# Patient Record
Sex: MALE | Race: WHITE | NOT HISPANIC OR LATINO | URBAN - METROPOLITAN AREA
[De-identification: names, ages, dates, MRNs, and addresses within clinical notes are randomized per-mention and may not be internally consistent; named-entity substitution may affect disease eponyms.]

---

## 2024-03-31 ENCOUNTER — INPATIENT (INPATIENT)
Facility: HOSPITAL | Age: 49
LOS: 3 days | Discharge: HOME | End: 2024-04-04
Attending: INTERNAL MEDICINE | Admitting: INTERNAL MEDICINE
Payer: SELF-PAY

## 2024-03-31 VITALS
HEART RATE: 150 BPM | DIASTOLIC BLOOD PRESSURE: 93 MMHG | OXYGEN SATURATION: 99 % | SYSTOLIC BLOOD PRESSURE: 125 MMHG | RESPIRATION RATE: 18 BRPM | WEIGHT: 169.98 LBS | HEIGHT: 70 IN | TEMPERATURE: 102 F

## 2024-03-31 LAB
ALBUMIN SERPL ELPH-MCNC: 3.3 G/DL — SIGNIFICANT CHANGE UP (ref 3.3–5)
ALP SERPL-CCNC: 115 U/L — SIGNIFICANT CHANGE UP (ref 40–120)
ALT FLD-CCNC: 44 U/L — SIGNIFICANT CHANGE UP (ref 12–78)
ANION GAP SERPL CALC-SCNC: 8 MMOL/L — SIGNIFICANT CHANGE UP (ref 5–17)
APPEARANCE UR: CLEAR — SIGNIFICANT CHANGE UP
APTT BLD: 31.2 SEC — SIGNIFICANT CHANGE UP (ref 24.5–35.6)
AST SERPL-CCNC: 23 U/L — SIGNIFICANT CHANGE UP (ref 15–37)
BACTERIA # UR AUTO: ABNORMAL /HPF
BASOPHILS # BLD AUTO: 0.03 K/UL — SIGNIFICANT CHANGE UP (ref 0–0.2)
BASOPHILS NFR BLD AUTO: 0.2 % — SIGNIFICANT CHANGE UP (ref 0–2)
BILIRUB SERPL-MCNC: 0.6 MG/DL — SIGNIFICANT CHANGE UP (ref 0.2–1.2)
BILIRUB UR-MCNC: NEGATIVE — SIGNIFICANT CHANGE UP
BUN SERPL-MCNC: 14 MG/DL — SIGNIFICANT CHANGE UP (ref 7–23)
CALCIUM SERPL-MCNC: 9.2 MG/DL — SIGNIFICANT CHANGE UP (ref 8.5–10.1)
CHLORIDE SERPL-SCNC: 104 MMOL/L — SIGNIFICANT CHANGE UP (ref 96–108)
CO2 SERPL-SCNC: 23 MMOL/L — SIGNIFICANT CHANGE UP (ref 22–31)
COLOR SPEC: YELLOW — SIGNIFICANT CHANGE UP
CREAT SERPL-MCNC: 1.13 MG/DL — SIGNIFICANT CHANGE UP (ref 0.5–1.3)
DIFF PNL FLD: ABNORMAL
EGFR: 79 ML/MIN/1.73M2 — SIGNIFICANT CHANGE UP
EOSINOPHIL # BLD AUTO: 0.04 K/UL — SIGNIFICANT CHANGE UP (ref 0–0.5)
EOSINOPHIL NFR BLD AUTO: 0.2 % — SIGNIFICANT CHANGE UP (ref 0–6)
EPI CELLS # UR: PRESENT
GLUCOSE SERPL-MCNC: 128 MG/DL — HIGH (ref 70–99)
GLUCOSE UR QL: NEGATIVE MG/DL — SIGNIFICANT CHANGE UP
HCT VFR BLD CALC: 43.7 % — SIGNIFICANT CHANGE UP (ref 39–50)
HGB BLD-MCNC: 14.5 G/DL — SIGNIFICANT CHANGE UP (ref 13–17)
IMM GRANULOCYTES NFR BLD AUTO: 0.4 % — SIGNIFICANT CHANGE UP (ref 0–0.9)
INR BLD: 1.12 RATIO — SIGNIFICANT CHANGE UP (ref 0.85–1.18)
KETONES UR-MCNC: ABNORMAL MG/DL
LACTATE SERPL-SCNC: 1.5 MMOL/L — SIGNIFICANT CHANGE UP (ref 0.7–2)
LEUKOCYTE ESTERASE UR-ACNC: NEGATIVE — SIGNIFICANT CHANGE UP
LYMPHOCYTES # BLD AUTO: 11.3 % — LOW (ref 13–44)
LYMPHOCYTES # BLD AUTO: 2.11 K/UL — SIGNIFICANT CHANGE UP (ref 1–3.3)
MCHC RBC-ENTMCNC: 30.4 PG — SIGNIFICANT CHANGE UP (ref 27–34)
MCHC RBC-ENTMCNC: 33.2 G/DL — SIGNIFICANT CHANGE UP (ref 32–36)
MCV RBC AUTO: 91.6 FL — SIGNIFICANT CHANGE UP (ref 80–100)
MONOCYTES # BLD AUTO: 1.43 K/UL — HIGH (ref 0–0.9)
MONOCYTES NFR BLD AUTO: 7.7 % — SIGNIFICANT CHANGE UP (ref 2–14)
NEUTROPHILS # BLD AUTO: 14.91 K/UL — HIGH (ref 1.8–7.4)
NEUTROPHILS NFR BLD AUTO: 80.2 % — HIGH (ref 43–77)
NITRITE UR-MCNC: NEGATIVE — SIGNIFICANT CHANGE UP
NRBC # BLD: 0 /100 WBCS — SIGNIFICANT CHANGE UP (ref 0–0)
PH UR: 6.5 — SIGNIFICANT CHANGE UP (ref 5–8)
PLATELET # BLD AUTO: 380 K/UL — SIGNIFICANT CHANGE UP (ref 150–400)
POTASSIUM SERPL-MCNC: 3.4 MMOL/L — LOW (ref 3.5–5.3)
POTASSIUM SERPL-SCNC: 3.4 MMOL/L — LOW (ref 3.5–5.3)
PROT SERPL-MCNC: 8.4 GM/DL — HIGH (ref 6–8.3)
PROT UR-MCNC: NEGATIVE MG/DL — SIGNIFICANT CHANGE UP
PROTHROM AB SERPL-ACNC: 13.4 SEC — HIGH (ref 9.5–13)
RBC # BLD: 4.77 M/UL — SIGNIFICANT CHANGE UP (ref 4.2–5.8)
RBC # FLD: 13 % — SIGNIFICANT CHANGE UP (ref 10.3–14.5)
RBC CASTS # UR COMP ASSIST: 4 /HPF — SIGNIFICANT CHANGE UP (ref 0–4)
SODIUM SERPL-SCNC: 135 MMOL/L — SIGNIFICANT CHANGE UP (ref 135–145)
SP GR SPEC: 1.01 — SIGNIFICANT CHANGE UP (ref 1–1.03)
UROBILINOGEN FLD QL: 0.2 MG/DL — SIGNIFICANT CHANGE UP (ref 0.2–1)
WBC # BLD: 18.6 K/UL — HIGH (ref 3.8–10.5)
WBC # FLD AUTO: 18.6 K/UL — HIGH (ref 3.8–10.5)
WBC UR QL: 2 /HPF — SIGNIFICANT CHANGE UP (ref 0–5)

## 2024-03-31 PROCEDURE — 99285 EMERGENCY DEPT VISIT HI MDM: CPT

## 2024-03-31 PROCEDURE — 76870 US EXAM SCROTUM: CPT | Mod: 26

## 2024-03-31 PROCEDURE — 93010 ELECTROCARDIOGRAM REPORT: CPT

## 2024-03-31 RX ORDER — KETOROLAC TROMETHAMINE 30 MG/ML
15 SYRINGE (ML) INJECTION ONCE
Refills: 0 | Status: DISCONTINUED | OUTPATIENT
Start: 2024-03-31 | End: 2024-03-31

## 2024-03-31 RX ORDER — MIDAZOLAM HYDROCHLORIDE 1 MG/ML
1 INJECTION, SOLUTION INTRAMUSCULAR; INTRAVENOUS ONCE
Refills: 0 | Status: DISCONTINUED | OUTPATIENT
Start: 2024-03-31 | End: 2024-03-31

## 2024-03-31 RX ORDER — SODIUM CHLORIDE 9 MG/ML
2400 INJECTION INTRAMUSCULAR; INTRAVENOUS; SUBCUTANEOUS ONCE
Refills: 0 | Status: COMPLETED | OUTPATIENT
Start: 2024-03-31 | End: 2024-03-31

## 2024-03-31 RX ORDER — CEFTRIAXONE 500 MG/1
1000 INJECTION, POWDER, FOR SOLUTION INTRAMUSCULAR; INTRAVENOUS ONCE
Refills: 0 | Status: COMPLETED | OUTPATIENT
Start: 2024-03-31 | End: 2024-03-31

## 2024-03-31 RX ORDER — ACETAMINOPHEN 500 MG
975 TABLET ORAL ONCE
Refills: 0 | Status: COMPLETED | OUTPATIENT
Start: 2024-03-31 | End: 2024-03-31

## 2024-03-31 RX ADMIN — SODIUM CHLORIDE 2400 MILLILITER(S): 9 INJECTION INTRAMUSCULAR; INTRAVENOUS; SUBCUTANEOUS at 21:20

## 2024-03-31 RX ADMIN — Medication 1 MILLIGRAM(S): at 22:51

## 2024-03-31 RX ADMIN — MIDAZOLAM HYDROCHLORIDE 1 MILLIGRAM(S): 1 INJECTION, SOLUTION INTRAMUSCULAR; INTRAVENOUS at 21:17

## 2024-03-31 RX ADMIN — CEFTRIAXONE 1000 MILLIGRAM(S): 500 INJECTION, POWDER, FOR SOLUTION INTRAMUSCULAR; INTRAVENOUS at 21:59

## 2024-03-31 RX ADMIN — Medication 15 MILLIGRAM(S): at 21:22

## 2024-03-31 RX ADMIN — Medication 975 MILLIGRAM(S): at 21:16

## 2024-03-31 RX ADMIN — Medication 110 MILLIGRAM(S): at 21:55

## 2024-03-31 RX ADMIN — CEFTRIAXONE 100 MILLIGRAM(S): 500 INJECTION, POWDER, FOR SOLUTION INTRAMUSCULAR; INTRAVENOUS at 21:29

## 2024-03-31 NOTE — ED PROVIDER NOTE - NS ED MD TWO NIGHTS YN
Yes Full Thickness Lip Wedge Repair (Flap) Text: Given the location of the defect and the proximity to free margins a full thickness wedge repair was deemed most appropriate.  Using a sterile surgical marker, the appropriate repair was drawn incorporating the defect and placing the expected incisions perpendicular to the vermilion border.  The vermilion border was also meticulously outlined to ensure appropriate reapproximation during the repair.  The area thus outlined was incised through and through with a #15 scalpel blade.  The muscularis and dermis were reaproximated with deep sutures following hemostasis. Care was taken to realign the vermilion border before proceeding with the superficial closure.  Once the vermilion was realigned the superfical and mucosal closure was finished.

## 2024-03-31 NOTE — ED PROVIDER NOTE - OBJECTIVE STATEMENT
48-year-old male with history anxiety presenting to the ED with complaints of right-sided testicle pain for the past several days reports has been ongoing for the past 3 days, patient denies any associated tactile fever but was noted to have a fever 102.3 over a year and elevated heart rate, patient denies any penile discharge denies any dysuria urgency or frequency.  Reported right testicular swelling erythema and pain reports 8 out of 10 pain.  Patient denies any associated trauma.  No reported flank pain.  No hematuria.  Denies other complaints.
Statement Selected

## 2024-03-31 NOTE — ED ADULT NURSE NOTE - OBJECTIVE STATEMENT
----- Message from Corry Mercedes MD sent at 11/19/2023 10:31 AM CST -----  Please tell patient her DOMINIQUE virus antibody is negative.  Her CD4 +count and liver function tests are fine to continue Tysabri.  Okay to continue Tysabri.  Thanks, LR   Pt is AOx4 c/o testicular pain x 4 days. Pt began feeling pain to R teste on Wednesday a/w swelling. Teste is TTP, pt reports difficulty urinating. Currently c/o L lower back pain and RLQ pain. C/o nausea, chills and fever today. Pt is very anxious on exam. Denies pmhx Covering primary RN. Pt is AOx4 c/o testicular pain x 4 days. Pt began feeling pain to R teste on Wednesday a/w swelling. Teste is TTP, pt reports difficulty urinating. Currently c/o L lower back pain and RLQ pain, nausea, chills and fever today. Pt is very anxious on exam. Denies SI/HI. Denies pmhx

## 2024-03-31 NOTE — ED PROVIDER NOTE - CLINICAL SUMMARY MEDICAL DECISION MAKING FREE TEXT BOX
48-year-old male with history anxiety presenting to the ED with complaints of right-sided testicle pain for the past several days reports has been ongoing for the past 3 days, patient denies any associated tactile fever but was noted to have a fever 102.3 over a year and elevated heart rate, patient denies any penile discharge denies any dysuria urgency or frequency.  Reported right testicular swelling erythema and pain reports 8 out of 10 pain.  Patient denies any associated trauma.  No reported flank pain.  No hematuria.  Denies other complaints.    Diagnosis includes testicular torsion with necrosis, given 3 days of symptoms without evaluation, versus epididymitis versus urinary tract infection versus prostatitis, ultrasound of testicles, labs cultures, broad-spectrum antibiotics follow-up imaging labs and reassess.  Will sign out to overnight attending for further management and disposition

## 2024-03-31 NOTE — ED ADULT TRIAGE NOTE - WEIGHT IN LBS
169.9
no discharge, no irritation, no pain, no redness, and no visual changes.
xray with no bone involvement. bleeding has stopped with Surgicel and pressure dressing. Will dc with hand for follow up.

## 2024-03-31 NOTE — ED PROVIDER NOTE - PHYSICAL EXAMINATION
VITAL SIGNS: I have reviewed nursing notes and confirm.  CONSTITUTIONAL: well-appearing, non-toxic  SKIN: Warm dry, normal skin turgor  HEAD: NCAT  CARD: RRR, no murmurs, rubs or gallops  RESP: clear to ausculation b/l.  No rales, rhonchi, or wheezing.  ABD: soft, + BS, non-tender, non-distended, no rebound or guarding. No CVA tenderness  : RN [place] chaperone, R testicle significantly swollen, erythema noted, tenderness no penile discharge   EXT: Full ROM, no bony tenderness, no pedal edema, no calf tenderness  NEURO: normal motor. normal sensory.  PSYCH: Cooperative, anxious

## 2024-04-01 DIAGNOSIS — A41.9 SEPSIS, UNSPECIFIED ORGANISM: ICD-10-CM

## 2024-04-01 DIAGNOSIS — F41.9 ANXIETY DISORDER, UNSPECIFIED: ICD-10-CM

## 2024-04-01 DIAGNOSIS — F17.200 NICOTINE DEPENDENCE, UNSPECIFIED, UNCOMPLICATED: ICD-10-CM

## 2024-04-01 DIAGNOSIS — N45.1 EPIDIDYMITIS: ICD-10-CM

## 2024-04-01 DIAGNOSIS — E87.6 HYPOKALEMIA: ICD-10-CM

## 2024-04-01 LAB
HCT VFR BLD CALC: 42.2 % — SIGNIFICANT CHANGE UP (ref 39–50)
HGB BLD-MCNC: 13.6 G/DL — SIGNIFICANT CHANGE UP (ref 13–17)
MCHC RBC-ENTMCNC: 30.1 PG — SIGNIFICANT CHANGE UP (ref 27–34)
MCHC RBC-ENTMCNC: 32.2 G/DL — SIGNIFICANT CHANGE UP (ref 32–36)
MCV RBC AUTO: 93.4 FL — SIGNIFICANT CHANGE UP (ref 80–100)
NRBC # BLD: 0 /100 WBCS — SIGNIFICANT CHANGE UP (ref 0–0)
PLATELET # BLD AUTO: 365 K/UL — SIGNIFICANT CHANGE UP (ref 150–400)
POTASSIUM SERPL-MCNC: 4.4 MMOL/L — SIGNIFICANT CHANGE UP (ref 3.5–5.3)
POTASSIUM SERPL-SCNC: 4.4 MMOL/L — SIGNIFICANT CHANGE UP (ref 3.5–5.3)
RBC # BLD: 4.52 M/UL — SIGNIFICANT CHANGE UP (ref 4.2–5.8)
RBC # FLD: 13.1 % — SIGNIFICANT CHANGE UP (ref 10.3–14.5)
WBC # BLD: 34.24 K/UL — HIGH (ref 3.8–10.5)
WBC # FLD AUTO: 34.24 K/UL — HIGH (ref 3.8–10.5)

## 2024-04-01 PROCEDURE — 99222 1ST HOSP IP/OBS MODERATE 55: CPT

## 2024-04-01 PROCEDURE — 99254 IP/OBS CNSLTJ NEW/EST MOD 60: CPT

## 2024-04-01 RX ORDER — IBUPROFEN 200 MG
600 TABLET ORAL ONCE
Refills: 0 | Status: COMPLETED | OUTPATIENT
Start: 2024-04-01 | End: 2024-04-01

## 2024-04-01 RX ORDER — CEFTRIAXONE 500 MG/1
INJECTION, POWDER, FOR SOLUTION INTRAMUSCULAR; INTRAVENOUS
Refills: 0 | Status: DISCONTINUED | OUTPATIENT
Start: 2024-04-01 | End: 2024-04-02

## 2024-04-01 RX ORDER — CEFTRIAXONE 500 MG/1
1000 INJECTION, POWDER, FOR SOLUTION INTRAMUSCULAR; INTRAVENOUS ONCE
Refills: 0 | Status: COMPLETED | OUTPATIENT
Start: 2024-04-01 | End: 2024-04-01

## 2024-04-01 RX ORDER — CEFTRIAXONE 500 MG/1
1000 INJECTION, POWDER, FOR SOLUTION INTRAMUSCULAR; INTRAVENOUS EVERY 24 HOURS
Refills: 0 | Status: DISCONTINUED | OUTPATIENT
Start: 2024-04-02 | End: 2024-04-02

## 2024-04-01 RX ORDER — ONDANSETRON 8 MG/1
4 TABLET, FILM COATED ORAL EVERY 8 HOURS
Refills: 0 | Status: DISCONTINUED | OUTPATIENT
Start: 2024-04-01 | End: 2024-04-04

## 2024-04-01 RX ORDER — ACETAMINOPHEN 500 MG
650 TABLET ORAL EVERY 6 HOURS
Refills: 0 | Status: DISCONTINUED | OUTPATIENT
Start: 2024-04-01 | End: 2024-04-04

## 2024-04-01 RX ORDER — POTASSIUM CHLORIDE 20 MEQ
20 PACKET (EA) ORAL ONCE
Refills: 0 | Status: COMPLETED | OUTPATIENT
Start: 2024-04-01 | End: 2024-04-01

## 2024-04-01 RX ORDER — LANOLIN ALCOHOL/MO/W.PET/CERES
3 CREAM (GRAM) TOPICAL AT BEDTIME
Refills: 0 | Status: DISCONTINUED | OUTPATIENT
Start: 2024-04-01 | End: 2024-04-04

## 2024-04-01 RX ADMIN — Medication 650 MILLIGRAM(S): at 01:21

## 2024-04-01 RX ADMIN — Medication 650 MILLIGRAM(S): at 17:33

## 2024-04-01 RX ADMIN — ONDANSETRON 4 MILLIGRAM(S): 8 TABLET, FILM COATED ORAL at 18:34

## 2024-04-01 RX ADMIN — Medication 650 MILLIGRAM(S): at 15:46

## 2024-04-01 RX ADMIN — CEFTRIAXONE 100 MILLIGRAM(S): 500 INJECTION, POWDER, FOR SOLUTION INTRAMUSCULAR; INTRAVENOUS at 17:13

## 2024-04-01 RX ADMIN — Medication 20 MILLIEQUIVALENT(S): at 01:56

## 2024-04-01 RX ADMIN — Medication 1 MILLIGRAM(S): at 01:49

## 2024-04-01 RX ADMIN — Medication 1 TABLET(S): at 09:26

## 2024-04-01 RX ADMIN — Medication 600 MILLIGRAM(S): at 03:26

## 2024-04-01 RX ADMIN — ONDANSETRON 4 MILLIGRAM(S): 8 TABLET, FILM COATED ORAL at 10:54

## 2024-04-01 NOTE — ED ADULT NURSE REASSESSMENT NOTE - NS ED NURSE REASSESS COMMENT FT1
Pt febrile to 103, tachy to 140s at rest. Dr. Wells made aware, prn tylenol given per order. Pt placed on cooling blanket. Continuous SPO2 and cardiac monitoring in place, plan of care ongoing. Pt febrile to 103, tachy to 140s at rest. Dr. Wells made aware, prn tylenol given per order. Ice packs placed on pt while looking for cooling blanket.. Continuous SPO2 and cardiac monitoring in place, plan of care ongoing.

## 2024-04-01 NOTE — H&P ADULT - NSHPLABSRESULTS_GEN_ALL_CORE
14.5   18.60 )-----------( 380      ( 31 Mar 2024 21:18 )             43.7       135  |  104  |  14  ----------------------------<  128<H>  3.4<L>   |  23  |  1.13    Ca    9.2      31 Mar 2024 21:18    TPro  8.4<H>  /  Alb  3.3  /  TBili  0.6  /  DBili  x   /  AST  23  /  ALT  44  /  AlkPhos  115      Urinalysis Basic - ( 31 Mar 2024 23:10 )    Color: Yellow / Appearance: Clear / S.006 / pH: x  Gluc: x / Ketone: Trace mg/dL  / Bili: Negative / Urobili: 0.2 mg/dL   Blood: x / Protein: Negative mg/dL / Nitrite: Negative   Leuk Esterase: Negative / RBC: 4 /HPF / WBC 2 /HPF   Sq Epi: x / Non Sq Epi: x / Bacteria: Few /HPF    U/S testicles 3/31/24  FINDINGS:  RIGHT:  Right testis: 4.5 cm x 3.3 cm x 4.7 cm. Normal echogenicity and echotexture with no masses or areas of architectural distortion. Increased vascularity.  Right epididymis: Increased vascularity.  Right hydrocele: Moderate complex hydrocele  Right varicocele: None.    LEFT:  Left testis: 4.9 cm x 3.1 cm x 3.2 cm. Normal echogenicity and echotexture with no masses or areas of architectural distortion. Normal arterial and venous blood flow pattern.  Left epididymis: Within normal limits.  Left hydrocele: Yes, small  Left varicocele: None.    IMPRESSION:  Findings consistent with right epididymoorchitis.

## 2024-04-01 NOTE — H&P ADULT - NSHPPHYSICALEXAM_GEN_ALL_CORE
T(C): 37.8 (04-01-24 @ 00:27), Max: 39.1 (03-31-24 @ 20:49)  HR: 102 (04-01-24 @ 00:25) (102 - 150)  BP: 124/89 (04-01-24 @ 00:25) (124/89 - 125/93)  RR: 18 (04-01-24 @ 00:25) (18 - 18)  SpO2: 100% (04-01-24 @ 00:25) (99% - 100%)    CONSTITUTIONAL: Well groomed, anxious-appearing, shivering  EYES: PERRLA and symmetric, EOMI  ENMT: Oral mucosa with moist membranes  RESP: No respiratory distress, no use of accessory muscles; CTA b/l  CV: tachycardic  GI: Soft, NT, ND  : R. testicle swollen, erythematous, and tender to palpation

## 2024-04-01 NOTE — ED ADULT NURSE REASSESSMENT NOTE - NS ED NURSE REASSESS COMMENT FT1
Pt received from covering nurse. On cooling blanket and Tylenol given for a fever of 103.1. Plan of care on going. Pt in stretcher. MD Yo is aware. Safety maintained.

## 2024-04-01 NOTE — CHART NOTE - NSCHARTNOTEFT_GEN_A_CORE
Justin Torres is a 48 year old male with PMHx of anxiety and tobacco use who presented to the ED on 3/31/24 for complaints of right testicular pain and admitted for sepsis secondary to R. epididymoorchitis.    Sepsis secondary to R. epididymo-orchitis  Complaints of severe right testicular pain x 4 days, associated diaphoresis, chills, decreased appetite  Pain is worse with getting up and improved with laying on R. recumbent position  Temp 102.3, , WBC 18.60K on admission  U/A with trace ketones, trace blood, negative nitrites, negative leuks, WBC 2, few bacteria, squamous epithelial cells present  U/S testicles with R. epididymoorchitis  S/p NS 2400 cc bolus, ceftriaxone 1 g IV, doxycycline 100 mg IV, levofloxacin 500 mg IV, acetaminophen 975 mg PO, ketorolac 15 mg IV in the ED  Bactrim 1 DS tab BID x 10 days started for empiric coverage of gram-negative bacteria given low risk for STI  F/u blood cultures, urine culture, chlamydia/gonorrhea NAAT  Monitor fever curve and WBC trend  urology consulted    Hypokalemia  Potassium 3.4 on admission  KCl 20 mEq ordered  F/u repeat K in AM    Chronic medical conditions:  Anxiety: s/p lorazepam 1 mg IV and midazolam 1 mg IV in the ED, not on any PTA meds  Tobacco use: smokes 1 ppd x 30 years, not yet ready to quit, declined nicotine patch and nicorette gum

## 2024-04-01 NOTE — PATIENT PROFILE ADULT - FALL HARM RISK - UNIVERSAL INTERVENTIONS
Bed in lowest position, wheels locked, appropriate side rails in place/Call bell, personal items and telephone in reach/Instruct patient to call for assistance before getting out of bed or chair/Non-slip footwear when patient is out of bed/[place] to call system/Physically safe environment - no spills, clutter or unnecessary equipment/Purposeful Proactive Rounding/Room/bathroom lighting operational, light cord in reach

## 2024-04-01 NOTE — H&P ADULT - ASSESSMENT
Justin Torres is a 48 year old male with PMHx of anxiety and tobacco use who presented to the ED on 3/31/24 for complaints of right testicular pain and admitted for sepsis secondary to R. epididymoorchitis.    Sepsis secondary to R. epididymo-orchitis  Complaints of severe right testicular pain x 4 days, associated diaphoresis, chills, decreased appetite  Pain is worse with getting up and improved with laying on R. recumbent position  Temp 102.3, , WBC 18.60K on admission  U/A with trace ketones, trace blood, negative nitrites, negative leuks, WBC 2, few bacteria, squamous epithelial cells present  U/S testicles with R. epididymoorchitis  S/p NS 2400 cc bolus, ceftriaxone 1 g IV, doxycycline 100 mg IV, levofloxacin 500 mg IV, acetaminophen 975 mg PO, ketorolac 15 mg IV in the ED  Bactrim 1 DS tab BID x 10 days started for empiric coverage of gram-negative bacteria given low risk for STI  F/u blood cultures, urine culture, chlamydia/gonorrhea NAAT  Monitor fever curve and WBC trend  Consult urology given no urologist on-call tonight? - please call in AM    Hypokalemia  Potassium 3.4 on admission  KCl 20 mEq ordered  F/u repeat K in AM      Chronic medical conditions:  Anxiety: s/p lorazepam 1 mg IV and midazolam 1 mg IV in the ED, not on any PTA meds  Tobacco use: smokes 1 ppd x 30 years, not yet ready to quit, declined nicotine patch and nicorette gum

## 2024-04-01 NOTE — H&P ADULT - HISTORY OF PRESENT ILLNESS
Patient: Manjinder Conley   YOB: 1978   MR Number: [id number]     Today's Date: 11/29/2023   Date of Admission: 11/27/2023   Attending Physician: Fred Jones MD       To whom it may concern:    Please excuse Manjinder Conley from work. His reason for missing work is that he was a patient under the care of the medical team at [facility] from 11/27/2023 through 11/29/2023.    He is to return to work on 12/5/2023.            Thank you,      Fred Jones MD  11/29/2023           Justin Torres is a 48 year old male with PMHx of anxiety and tobacco use who presented to the ED on 3/31/24 for complaints of right testicular pain.    Patient reports he developed severe right testicular pain on Thursday and thought it was related to his anxiety. Since he already had a doctor's appointment for a physical with his union, he decided he would just ask their thoughts on it. He does not recall if they made any recommendations regarding his right testicular pain. Today, he became diaphoretic and with decreased appetite. Associated chills and "feels like he is freezing." States the pain was unbearable and severity was 7/10. Improved with laying in right lateral recumbent position and exacerbated with getting up. States that any movement that allows his scrotum to touch his leg worsens the pain. Did not take any analgesics at home. Has not been sexually active with wife in three years. Denies history of anal intercourse. No history of STDs. Denies dysuria, urinary frequency, or malodorous urine.     In the ED, VSS except Tmax 102.3 and HR as elevated as 150. WBC 18.60K, potassium 3.4. U/A with trace ketones, trace blood, negative nitrites, negative leuks, WBC 2, few bacteria, squamous epithelial cells present. U/S testicles with right epididymoorchitis. Received NS 2400 cc bolus, ceftriaxone 1 g IV, doxycycline 100 mg IV, levofloxacin 500 mg IV, acetaminophen 975 mg PO, ketorolac 15 mg IV, lorazepam 1 mg IV, and midazolam 1 mg IV. ER physician unable to contact urology because there is no urologist on call tonight.

## 2024-04-01 NOTE — CONSULT NOTE ADULT - ASSESSMENT
49 Y/O male with PMHx of anxiety and tobacco use who presented to the ED on 3/31/24 for complaints of right testicular pain. Urology consulted to evaluate epididymoorchitis of right testicle.   US of testicles reveals right epididymoorchitis.   Tmax of 103F overnight. Leukocytosis worsening.       PLAN:     - Recommend Rocephin for ABx   - Continue care per primary team   - Discussed with Dr. Artis  47 Y/O male with PMHx of anxiety and tobacco use who presented to the ED on 3/31/24 for complaints of right testicular pain. Urology consulted to evaluate epididymoorchitis of right testicle.   US of testicles reveals right epididymoorchitis.   Tmax of 103F overnight. Leukocytosis worsening.       PLAN:     - Recommend Rocephin for ABx   - F/U UCx   - Continue care per primary team   - Discussed with Dr. Artis

## 2024-04-02 LAB
ALBUMIN SERPL ELPH-MCNC: 2.5 G/DL — LOW (ref 3.3–5)
ALP SERPL-CCNC: 94 U/L — SIGNIFICANT CHANGE UP (ref 40–120)
ALT FLD-CCNC: 37 U/L — SIGNIFICANT CHANGE UP (ref 12–78)
ANION GAP SERPL CALC-SCNC: 5 MMOL/L — SIGNIFICANT CHANGE UP (ref 5–17)
AST SERPL-CCNC: 22 U/L — SIGNIFICANT CHANGE UP (ref 15–37)
BILIRUB SERPL-MCNC: 0.4 MG/DL — SIGNIFICANT CHANGE UP (ref 0.2–1.2)
BUN SERPL-MCNC: 18 MG/DL — SIGNIFICANT CHANGE UP (ref 7–23)
C TRACH RRNA SPEC QL NAA+PROBE: SIGNIFICANT CHANGE UP
CALCIUM SERPL-MCNC: 9.1 MG/DL — SIGNIFICANT CHANGE UP (ref 8.5–10.1)
CHLORIDE SERPL-SCNC: 108 MMOL/L — SIGNIFICANT CHANGE UP (ref 96–108)
CO2 SERPL-SCNC: 29 MMOL/L — SIGNIFICANT CHANGE UP (ref 22–31)
CREAT SERPL-MCNC: 0.9 MG/DL — SIGNIFICANT CHANGE UP (ref 0.5–1.3)
CULTURE RESULTS: NO GROWTH — SIGNIFICANT CHANGE UP
EGFR: 104 ML/MIN/1.73M2 — SIGNIFICANT CHANGE UP
GLUCOSE SERPL-MCNC: 87 MG/DL — SIGNIFICANT CHANGE UP (ref 70–99)
HCT VFR BLD CALC: 39.3 % — SIGNIFICANT CHANGE UP (ref 39–50)
HGB BLD-MCNC: 13.1 G/DL — SIGNIFICANT CHANGE UP (ref 13–17)
LACTATE SERPL-SCNC: 1.3 MMOL/L — SIGNIFICANT CHANGE UP (ref 0.7–2)
MAGNESIUM SERPL-MCNC: 2.3 MG/DL — SIGNIFICANT CHANGE UP (ref 1.6–2.6)
MCHC RBC-ENTMCNC: 30.3 PG — SIGNIFICANT CHANGE UP (ref 27–34)
MCHC RBC-ENTMCNC: 33.3 G/DL — SIGNIFICANT CHANGE UP (ref 32–36)
MCV RBC AUTO: 91 FL — SIGNIFICANT CHANGE UP (ref 80–100)
N GONORRHOEA RRNA SPEC QL NAA+PROBE: SIGNIFICANT CHANGE UP
NRBC # BLD: 0 /100 WBCS — SIGNIFICANT CHANGE UP (ref 0–0)
PHOSPHATE SERPL-MCNC: 2.4 MG/DL — LOW (ref 2.5–4.5)
PLATELET # BLD AUTO: 360 K/UL — SIGNIFICANT CHANGE UP (ref 150–400)
POTASSIUM SERPL-MCNC: 4 MMOL/L — SIGNIFICANT CHANGE UP (ref 3.5–5.3)
POTASSIUM SERPL-SCNC: 4 MMOL/L — SIGNIFICANT CHANGE UP (ref 3.5–5.3)
PROT SERPL-MCNC: 7 GM/DL — SIGNIFICANT CHANGE UP (ref 6–8.3)
RBC # BLD: 4.32 M/UL — SIGNIFICANT CHANGE UP (ref 4.2–5.8)
RBC # FLD: 13.1 % — SIGNIFICANT CHANGE UP (ref 10.3–14.5)
SODIUM SERPL-SCNC: 142 MMOL/L — SIGNIFICANT CHANGE UP (ref 135–145)
SPECIMEN SOURCE: SIGNIFICANT CHANGE UP
SPECIMEN SOURCE: SIGNIFICANT CHANGE UP
WBC # BLD: 19.37 K/UL — HIGH (ref 3.8–10.5)
WBC # FLD AUTO: 19.37 K/UL — HIGH (ref 3.8–10.5)

## 2024-04-02 PROCEDURE — 99222 1ST HOSP IP/OBS MODERATE 55: CPT

## 2024-04-02 PROCEDURE — 99232 SBSQ HOSP IP/OBS MODERATE 35: CPT

## 2024-04-02 RX ORDER — SODIUM,POTASSIUM PHOSPHATES 278-250MG
2 POWDER IN PACKET (EA) ORAL
Refills: 0 | Status: COMPLETED | OUTPATIENT
Start: 2024-04-02 | End: 2024-04-03

## 2024-04-02 RX ORDER — NICOTINE POLACRILEX 2 MG
1 GUM BUCCAL DAILY
Refills: 0 | Status: DISCONTINUED | OUTPATIENT
Start: 2024-04-02 | End: 2024-04-04

## 2024-04-02 RX ORDER — CEFTRIAXONE 500 MG/1
1000 INJECTION, POWDER, FOR SOLUTION INTRAMUSCULAR; INTRAVENOUS ONCE
Refills: 0 | Status: COMPLETED | OUTPATIENT
Start: 2024-04-02 | End: 2024-04-02

## 2024-04-02 RX ORDER — ENOXAPARIN SODIUM 100 MG/ML
40 INJECTION SUBCUTANEOUS EVERY 24 HOURS
Refills: 0 | Status: DISCONTINUED | OUTPATIENT
Start: 2024-04-02 | End: 2024-04-04

## 2024-04-02 RX ORDER — CEFTRIAXONE 500 MG/1
2000 INJECTION, POWDER, FOR SOLUTION INTRAMUSCULAR; INTRAVENOUS EVERY 24 HOURS
Refills: 0 | Status: DISCONTINUED | OUTPATIENT
Start: 2024-04-03 | End: 2024-04-04

## 2024-04-02 RX ORDER — TETANUS TOXOID, REDUCED DIPHTHERIA TOXOID AND ACELLULAR PERTUSSIS VACCINE, ADSORBED 5; 2.5; 8; 8; 2.5 [IU]/.5ML; [IU]/.5ML; UG/.5ML; UG/.5ML; UG/.5ML
0.5 SUSPENSION INTRAMUSCULAR ONCE
Refills: 0 | Status: COMPLETED | OUTPATIENT
Start: 2024-04-02 | End: 2024-04-04

## 2024-04-02 RX ADMIN — CEFTRIAXONE 100 MILLIGRAM(S): 500 INJECTION, POWDER, FOR SOLUTION INTRAMUSCULAR; INTRAVENOUS at 16:31

## 2024-04-02 RX ADMIN — ENOXAPARIN SODIUM 40 MILLIGRAM(S): 100 INJECTION SUBCUTANEOUS at 16:32

## 2024-04-02 RX ADMIN — Medication 2 PACKET(S): at 17:30

## 2024-04-02 RX ADMIN — Medication 2 PACKET(S): at 23:45

## 2024-04-02 NOTE — CONSULT NOTE ADULT - SUBJECTIVE AND OBJECTIVE BOX
[facility]  INFECTIOUS DISEASES   [address] [place] [address]  Tel: [phone]     Fax: [phone]  ==============================================================================  DO Sara Beck MD Alexandra Gutman, NP   ==============================================================================    VAZQUEZ MAXWELL  [id number]  Male  48y ([birth year])        Patient is a 48y old  Male who presents with a chief complaint of Sepsis secondary to R. epididymo-orchitis (02 Apr 2024 13:01)      HPI:  Vazquez Maxwell is a 48 year old male with PMHx of anxiety and tobacco use who presented to the ED on 3/31/24 for complaints of right testicular pain.    Patient reports he developed severe right testicular pain on Thursday and thought it was related to his anxiety. Since he already had a doctor's appointment for a physical with his union, he decided he would just ask their thoughts on it. He does not recall if they made any recommendations regarding his right testicular pain. Today, he became diaphoretic and with decreased appetite. Associated chills and "feels like he is freezing." States the pain was unbearable and severity was 7/10. Improved with laying in right lateral recumbent position and exacerbated with getting up. States that any movement that allows his scrotum to touch his leg worsens the pain. Did not take any analgesics at home. Has not been sexually active with wife in three years. Denies history of anal intercourse. No history of STDs. Denies dysuria, urinary frequency, or malodorous urine.     In the ED, VSS except Tmax 102.3 and HR as elevated as 150. WBC 18.60K, potassium 3.4. U/A with trace ketones, trace blood, negative nitrites, negative leuks, WBC 2, few bacteria, squamous epithelial cells present. U/S testicles with right epididymoorchitis. Received NS 2400 cc bolus, ceftriaxone 1 g IV, doxycycline 100 mg IV, levofloxacin 500 mg IV, acetaminophen 975 mg PO, ketorolac 15 mg IV, lorazepam 1 mg IV, and midazolam 1 mg IV. ER physician unable to contact urology because there is no urologist on call tonight. (01 Apr 2024 01:18)      ID consulted for workup and antibiotic management     PAST MEDICAL & SURGICAL HISTORY:  Anxiety      Tobacco dependence with current use          Allergies  No Known Allergies        ANTIMICROBIALS:      MEDICATIONS  (STANDING):    cefTRIAXone   IVPB   100 mL/Hr IV Intermittent (04-01-24 @ 17:13)    cefTRIAXone   IVPB   100 mL/Hr IV Intermittent (04-02-24 @ 16:31)    cefTRIAXone   IVPB   100 mL/Hr IV Intermittent (03-31-24 @ 21:29)    doxycycline IVPB   110 mL/Hr IV Intermittent (03-31-24 @ 21:55)    levoFLOXacin IVPB   100 mL/Hr IV Intermittent (04-01-24 @ 00:11)    trimethoprim  160 mG/sulfamethoxazole 800 mG   1 Tablet(s) Oral (04-01-24 @ 09:26)        OTHER MEDS: MEDICATIONS  (STANDING):  acetaminophen     Tablet .. 650 every 6 hours PRN  aluminum hydroxide/magnesium hydroxide/simethicone Suspension 30 every 4 hours PRN  enoxaparin Injectable 40 every 24 hours  melatonin 3 at bedtime PRN  ondansetron Injectable 4 every 8 hours PRN      SOCIAL HISTORY:     Smoking Cigarettes [ ]Active [ ] Former [ ]Denies   ETOH [ ]denies [ ]Former [ ]Current Use denies   Drug Use [ ]Never [ ] Former [ ] Active     FAMILY HISTORY:      REVIEW OF SYSTEMS  [  ] ROS unobtainable because:    [  ] All other systems negative except as noted below:	    Constitutional:  [ ] fever [ ] chills  [ ] weight loss  [ ] weakness  Skin:  [ ] rash [ ] phlebitis	  Eyes: [ ] icterus [ ] pain  [ ] discharge	  ENMT: [ ] sore throat  [ ] thrush [ ] ulcers [ ] exudates  Respiratory: [ ] dyspnea [ ] hemoptysis [ ] cough [ ] sputum	  Cardiovascular:  [ ] chest pain [ ] palpitations [ ] edema	  Gastrointestinal:  [ ] nausea [ ] vomiting [ ] diarrhea [ ] constipation [ ] pain	  Genitourinary:  [ ] dysuria [ ] frequency [ ] hematuria [ ] discharge [ ] flank pain  [ ] incontinence  Musculoskeletal:  [ ] myalgias [ ] arthralgias [ ] arthritis  [ ] back pain  Neurological:  [ ] headache [ ] seizures  [ ] confusion/altered mental status  Psychiatric:  [ ] anxiety [ ] depression	  Hematology/Lymphatics:  [ ] lymphadenopathy  Endocrine:  [ ] adrenal [ ] thyroid  Allergic/Immunologic:	 [ ] transplant [ ] seasonal    Vital Signs Last 24 Hrs  T(F): 98.2 (04-02-24 @ 10:42), Max: 103.1 (04-01-24 @ 01:15)    Vital Signs Last 24 Hrs  HR: 98 (04-02-24 @ 10:42) (91 - 105)  BP: 102/69 (04-02-24 @ 10:42) (99/62 - 107/72)  RR: 17 (04-02-24 @ 10:42)  SpO2: 94% (04-02-24 @ 10:42) (93% - 97%)  Wt(kg): --    PHYSICAL EXAM:  Constitutional: non-toxic, no distress  HEAD/EYES: anicteric, no conjunctival injection  ENT:  supple, no thrush  Cardiovascular:   normal S1, S2, no murmur, no edema  Respiratory:  clear BS bilaterally, no wheezes, no rales  GI:  soft, non-tender, normal bowel sounds  :  no andre, no CVA tenderness, erythematous swollen and painful right testes   Musculoskeletal:  no synovitis, normal ROM  Neurologic: awake and alert, normal strength, no focal findings  Skin:  no rash, no erythema, no phlebitis  Heme/Onc: no lymphadenopathy   Psychiatric:  awake, alert, appropriate mood          WBC Count: 19.37 K/uL (04-02 @ 10:05)  WBC Count: 34.24 K/uL (04-01 @ 09:58)  WBC Count: 18.60 K/uL (03-31 @ 21:18)      Auto Neutrophil %: 80.2 % *H* (03-31-24 @ 21:18)  Auto Neutrophil #: 14.91 K/uL *H* (03-31-24 @ 21:18)                            13.1   19.37 )-----------( 360      ( 02 Apr 2024 10:05 )             39.3       04-02    142  |  108  |  18  ----------------------------<  87  4.0   |  29  |  0.90    Ca    9.1      02 Apr 2024 10:05  Phos  2.4     04-02  Mg     2.3     04-02    TPro  7.0  /  Alb  2.5<L>  /  TBili  0.4  /  DBili  x   /  AST  22  /  ALT  37  /  AlkPhos  94  04-02      Creatinine Trend: 0.90<--, 1.13<--      Lactate, Blood: 1.3 mmol/L (04-02-24 @ 10:05)  Lactate, Blood: 1.5 mmol/L (03-31-24 @ 21:18)      MICROBIOLOGY:  Clean Catch Clean Catch (Midstream)  03-31-24   No growth  --  --      .Blood Blood-Peripheral  03-31-24   No growth at 24 hours  --  --      .Blood Blood-Peripheral  03-31-24   No growth at 24 hours  --  --      RADIOLOGY:  < from: US Testicles (03.31.24 @ 22:45) >  IMPRESSION:    Findings consistent with right epididymoorchitis.      I have personally reviewed the above imaging 
  Chief Complaint:  Patient is a 48y old  Male who presents with a chief complaint of Sepsis secondary to R. epididymo-orchitis (2024 01:18)      HPI:  Justin Torres is a 48 year old male with PMHx of anxiety and tobacco use who presented to the ED on 3/31/24 for complaints of right testicular pain.    Patient reports he developed severe right testicular pain on Thursday and thought it was related to his anxiety. Since he already had a doctor's appointment for a physical with his union, he decided he would just ask their thoughts on it. He does not recall if they made any recommendations regarding his right testicular pain. Today, he became diaphoretic and with decreased appetite. Associated chills and "feels like he is freezing." States the pain was unbearable and severity was 7/10. Improved with laying in right lateral recumbent position and exacerbated with getting up. States that any movement that allows his scrotum to touch his leg worsens the pain. Did not take any analgesics at home. Has not been sexually active with wife in three years. Denies history of anal intercourse. No history of STDs. Denies dysuria, urinary frequency, or malodorous urine.     In the ED, VSS except Tmax 102.3 and HR as elevated as 150. WBC 18.60K, potassium 3.4. U/A with trace ketones, trace blood, negative nitrites, negative leuks, WBC 2, few bacteria, squamous epithelial cells present. U/S testicles with right epididymoorchitis. Received NS 2400 cc bolus, ceftriaxone 1 g IV, doxycycline 100 mg IV, levofloxacin 500 mg IV, acetaminophen 975 mg PO, ketorolac 15 mg IV, lorazepam 1 mg IV, and midazolam 1 mg IV. ER physician unable to contact urology because there is no urologist on call tonight. (2024 01:18)      PMH/PSH:PAST MEDICAL & SURGICAL HISTORY:  Anxiety      Tobacco dependence with current use          Allergies:  No Known Allergies      Medications:  acetaminophen     Tablet .. 650 milliGRAM(s) Oral every 6 hours PRN  aluminum hydroxide/magnesium hydroxide/simethicone Suspension 30 milliLiter(s) Oral every 4 hours PRN  melatonin 3 milliGRAM(s) Oral at bedtime PRN  ondansetron Injectable 4 milliGRAM(s) IV Push every 8 hours PRN  trimethoprim  160 mG/sulfamethoxazole 800 mG 1 Tablet(s) Oral two times a day      Review of Systems:  Negative except for HPI    Relevant Family History:   FAMILY HISTORY:      Relevant Social History: Alcohol ( -) , Tobacco ( -) , Illicit drugs (- )     Physical Exam:    Vital Signs:  Vital Signs Last 24 Hrs  T(C): 37.1 (2024 11:04), Max: 39.5 (2024 01:15)  T(F): 98.8 (2024 11:04), Max: 103.1 (2024 01:15)  HR: 103 (2024 11:04) (91 - 150)  BP: 108/68 (2024 11:04) (100/68 - 147/89)  BP(mean): --  RR: 17 (2024 11:04) (17 - 25)  SpO2: 97% (2024 11:04) (97% - 100%)    Parameters below as of 2024 11:04  Patient On (Oxygen Delivery Method): room air      Daily Height in cm: 177.8 (2024 06:30)    Daily     General:  Appears stated age, no distress  HEENT:  NC/AT,  conjunctivae clear and pink  Chest:  Full & symmetric excursion  Cardiovascular:  Regular rhythm  Abdomen:  Soft, non tender, non distended  Extremities:  no cyanosis, clubbing or edema  : Circumcised phallus with right enlarged, erythematous, swollen testicle with TTP; left testicle nontender   Skin:  No rash/erythema/ecchymoses/petechiae/wounds/abscess/warm/dry  Neuro/Psych:  Alert, oriented    Laboratory:                          13.6   34.24 )-----------( 365      ( 2024 09:58 )             42.2     04-01    x   |  x   |  x   ----------------------------<  x   4.4   |  x   |  x     Ca    9.2      31 Mar 2024 21:18    TPro  8.4<H>  /  Alb  3.3  /  TBili  0.6  /  DBili  x   /  AST  23  /  ALT  44  /  AlkPhos  115  03-31    LIVER FUNCTIONS - ( 31 Mar 2024 21:18 )  Alb: 3.3 g/dL / Pro: 8.4 gm/dL / ALK PHOS: 115 U/L / ALT: 44 U/L / AST: 23 U/L / GGT: x           PT/INR - ( 31 Mar 2024 21:18 )   PT: 13.4 sec;   INR: 1.12 ratio         PTT - ( 31 Mar 2024 21:18 )  PTT:31.2 sec  Urinalysis Basic - ( 31 Mar 2024 23:10 )    Color: Yellow / Appearance: Clear / S.006 / pH: x  Gluc: x / Ketone: Trace mg/dL  / Bili: Negative / Urobili: 0.2 mg/dL   Blood: x / Protein: Negative mg/dL / Nitrite: Negative   Leuk Esterase: Negative / RBC: 4 /HPF / WBC 2 /HPF   Sq Epi: x / Non Sq Epi: x / Bacteria: Few /HPF          Imaging:    < from: US Testicles (24 @ 22:45) >    ACC: [id number] EXAM:  US SCROTUM AND CONTENTS   ORDERED BY: MACKENZIE MICHAEL     PROCEDURE DATE:  2024          INTERPRETATION:  CLINICAL INFORMATION: Right testicular pain.    COMPARISON: None available.    TECHNIQUE: Testicular ultrasound utilizing color and spectral Doppler.    FINDINGS:    RIGHT:  Right testis: 4.5 cm x 3.3 cm x 4.7 cm. Normal echogenicity and   echotexture with no masses or areas of architectural distortion.   Increased vascularity.  Right epididymis: Increased vascularity.  Right hydrocele: Moderate complex hydrocele  Right varicocele: None.    LEFT:  Left testis: 4.9 cm x 3.1 cm x 3.2 cm. Normal echogenicity and   echotexture with no masses or areas of architectural distortion. Normal   arterial and venous blood flow pattern.  Left epididymis: Within normal limits.  Left hydrocele: Yes, small  Left varicocele: None.    IMPRESSION:    Findings consistent with right epididymoorchitis.        --- End of Report ---            REJI WOODS MD; Attending Radiologist  This document has been electronically signed. Mar 31 2024 10:57PM    < end of copied text >

## 2024-04-02 NOTE — CONSULT NOTE ADULT - ASSESSMENT
Justin Torres is a 48 year old male with PMHx of anxiety and tobacco use who presented to the ED on 3/31/24 for complaints of right testicular pain.  Fever, tachycardia and neutrophilic Leukocytosis   exam consistent with epididymitis   gc/chlamydia negative, denies high risk sexual behavior     Plan:  ceftriaxone 2g q24rhs   follow urine culture  follow blood cultures   send HIV in the AM  send syphilis in the AM   place bag of ice under testes to elevate  nicotine patch to help with cravings   smoking cessation counselled- continue to encourage quitting and offer [facility]   TDAP before discharge     Discussed with Dr. Calvin Hare, DO  Chief, Infectious Disease at [facility]  Reachable via Microsoft Teams or ID office: [phone]  Weekdays: After 5pm, please call [phone] for all inquiries and new consults  Weekends: Message on-call infectious disease physician via teams (see Gary)

## 2024-04-02 NOTE — PROGRESS NOTE ADULT - SUBJECTIVE AND OBJECTIVE BOX
Patient seen and examined at bedside, patient without complaints, states scrotal pain has improved.      MEDICATIONS  (STANDING):  cefTRIAXone   IVPB      cefTRIAXone   IVPB 1000 milliGRAM(s) IV Intermittent every 24 hours    MEDICATIONS  (PRN):  acetaminophen     Tablet .. 650 milliGRAM(s) Oral every 6 hours PRN Temp greater or equal to 38C (100.4F), Mild Pain (1 - 3)  aluminum hydroxide/magnesium hydroxide/simethicone Suspension 30 milliLiter(s) Oral every 4 hours PRN Dyspepsia  melatonin 3 milliGRAM(s) Oral at bedtime PRN Insomnia  ondansetron Injectable 4 milliGRAM(s) IV Push every 8 hours PRN Nausea and/or Vomiting      Vital Signs Last 24 Hrs  T(C): 36.8 (02 Apr 2024 10:42), Max: 37.7 (01 Apr 2024 17:33)  T(F): 98.2 (02 Apr 2024 10:42), Max: 99.9 (01 Apr 2024 17:33)  HR: 98 (02 Apr 2024 10:42) (91 - 105)  BP: 102/69 (02 Apr 2024 10:42) (99/62 - 107/72)  RR: 17 (02 Apr 2024 10:42) (17 - 18)  SpO2: 94% (02 Apr 2024 10:42) (93% - 97%)    Parameters below as of 02 Apr 2024 05:20  Patient On (Oxygen Delivery Method): room air      PHYSICAL EXAM:  General: NAD  Neuro:  Alert & oriented x 3  HEENT: NCAT, EOMI, conjunctiva clear  CV: +S1+S2  Lung: Respirations nonlabored, good inspiratory effort  Abdomen: soft, NTND. Normoactive BS  Extremities: no pedal edema or calf tenderness noted   : Circumcised phallus with right enlarged, erythematous, swollen testicle with minimal TTP; left testicle nontender   Skin:  No rash/erythema/ecchymoses/petechiae/wounds/abscess/warm/dry          LABS:                        13.1   19.37 )-----------( 360      ( 02 Apr 2024 10:05 )             39.3     04-02    142  |  108  |  18  ----------------------------<  87  4.0   |  29  |  0.90    Ca    9.1      02 Apr 2024 10:05  Phos  2.4     04-02  Mg     2.3     04-02    TPro  7.0  /  Alb  2.5<L>  /  TBili  0.4  /  DBili  x   /  AST  22  /  ALT  37  /  AlkPhos  94  04-02    PT/INR - ( 31 Mar 2024 21:18 )   PT: 13.4 sec;   INR: 1.12 ratio         PTT - ( 31 Mar 2024 21:18 )  PTT:31.2 sec  Urinalysis Basic - ( 02 Apr 2024 10:05 )    Color: x / Appearance: x / SG: x / pH: x  Gluc: 87 mg/dL / Ketone: x  / Bili: x / Urobili: x   Blood: x / Protein: x / Nitrite: x   Leuk Esterase: x / RBC: x / WBC x   Sq Epi: x / Non Sq Epi: x / Bacteria: x

## 2024-04-02 NOTE — PROGRESS NOTE ADULT - ASSESSMENT
48 year old male with PMHx of anxiety and tobacco use who presented to the ED on 3/31/24 for complaints of right testicular pain.    Patient reports he developed severe right testicular pain on Thursday and thought it was related to his anxiety. Since he already had a doctor's appointment for a physical with his union, he decided he would just ask their thoughts on it. He does not recall if they made any recommendations regarding his right testicular pain. Today, he became diaphoretic and with decreased appetite. Associated chills and "feels like he is freezing." States the pain was unbearable and severity was 7/10. Improved with laying in right lateral recumbent position and exacerbated with getting up. States that any movement that allows his scrotum to touch his leg worsens the pain. Did not take any analgesics at home. Has not been sexually active with wife in three years. Denies history of anal intercourse. No history of STDs. Denies dysuria, urinary frequency, or malodorous urine.     In the ED, VSS except Tmax 102.3 and HR as elevated as 150. WBC 18.60K, potassium 3.4. U/A with trace ketones, trace blood, negative nitrites, negative leuks, WBC 2, few bacteria, squamous epithelial cells present. U/S testicles with right epididymoorchitis. Received NS 2400 cc bolus, ceftriaxone 1 g IV, doxycycline 100 mg IV, levofloxacin 500 mg IV, acetaminophen 975 mg PO, ketorolac 15 mg IV, lorazepam 1 mg IV, and midazolam 1 mg IV. ER physician unable to contact urology because there is no urologist on call tonight.    48 year old male with PMHx of anxiety and tobacco presented w/ right testicular pain and was admitted for sepsis POA secondary to R. epididymoorchitis.    Sepsis secondary to R. epididymo-orchitis  Complaints of severe right testicular pain x 4 days, associated diaphoresis, chills, decreased appetite  Pain is worse with getting up and improved with laying on R. recumbent position  Temp 102.3, , WBC 18.60K on admission  U/A with trace ketones, trace blood, negative nitrites, negative leuks, WBC 2, few bacteria, squamous epithelial cells present  U/S testicles with R. epididymoorchitis  S/p NS 2400 cc bolus, ceftriaxone 1 g IV, doxycycline 100 mg IV, levofloxacin 500 mg IV, acetaminophen 975 mg PO, ketorolac 15 mg IV in the ED  Bactrim 1 DS tab BID x 10 days started for empiric coverage of gram-negative bacteria given low risk for STI  F/u blood cultures, urine culture, chlamydia/gonorrhea NAAT  Monitor fever curve and WBC trend  Consult urology given no urologist on-call tonight? - please call in AM       Anxiety: s/p lorazepam 1 mg IV and midazolam 1 mg IV in the ED, not on any PTA meds  Tobacco use: smokes 1 ppd x 30 years, not yet ready to quit, declined nicotine patch and nicorette gum     48 year old male with PMHx of anxiety and tobacco presented w/ right testicular pain and was admitted for sepsis POA secondary to R. epididymoorchitis.    Sepsis secondary to R. epididymoorchitis   - U/S testicles with R. epididymoorchitis   - NGTD on blood cultures  - urine culture negative  - chlamydia/gonorrhea NAAT pending   - urology following  - c/w Rocephin  - consult ID    Hypophosphatemia  - replace w/ PO Phos    Tobacco use  - pt smokes 1 ppd x 30 years, not yet ready to quit  - as per H&P, he declined nicotine patch and Nicorette gum    Prophylaxis:  DVT: Lovenox  GI: PO diet       48 year old male with PMHx of anxiety and tobacco presented w/ right testicular pain and was admitted for sepsis POA secondary to R. epididymoorchitis.    Sepsis secondary to R. epididymoorchitis   - U/S testicles with R. epididymoorchitis   - NGTD on blood cultures  - urine culture negative  - chlamydia/gonorrhea NAAT pending   - urology following  - c/w Rocephin  - as per ID, increased Rocephin to 2gm    Hypophosphatemia  - replace w/ PO Phos    Tobacco use  - pt smokes 1 ppd x 30 years, not yet ready to quit  - as per H&P, he declined nicotine patch and Nicorette gum    Prophylaxis:  DVT: Lovenox  GI: PO diet

## 2024-04-02 NOTE — PROGRESS NOTE ADULT - NS ATTEND AMEND GEN_ALL_CORE FT
Pt was seen and examined.  Tender but still w normal rugae.  Slighty erythematous rt hemiscrotum w no fluctuance.     Had ice on it; normal testicles  reviewed the imaging and no lesions seen    I placed rolled towel and suggested elevation and ice, but to wrap it w paper towels and not to place directly on skin.    So far, all cx's neg and I explained to he and his mom that it can happen and that this is common.  F/u ID tests.  he will also need out pt f/u w us/

## 2024-04-02 NOTE — PROGRESS NOTE ADULT - SUBJECTIVE AND OBJECTIVE BOX
Patient is a 48y old  Male who presents with a chief complaint of Sepsis secondary to R. epididymo-orchitis (01 Apr 2024 16:18)      INTERVAL HPI/OVERNIGHT EVENTS:    MEDICATIONS  (STANDING):  cefTRIAXone   IVPB      cefTRIAXone   IVPB 1000 milliGRAM(s) IV Intermittent every 24 hours    MEDICATIONS  (PRN):  acetaminophen     Tablet .. 650 milliGRAM(s) Oral every 6 hours PRN Temp greater or equal to 38C (100.4F), Mild Pain (1 - 3)  aluminum hydroxide/magnesium hydroxide/simethicone Suspension 30 milliLiter(s) Oral every 4 hours PRN Dyspepsia  melatonin 3 milliGRAM(s) Oral at bedtime PRN Insomnia  ondansetron Injectable 4 milliGRAM(s) IV Push every 8 hours PRN Nausea and/or Vomiting      Allergies    No Known Allergies    Intolerances        Vital Signs Last 24 Hrs  T(C): 36.8 (02 Apr 2024 10:42), Max: 37.7 (01 Apr 2024 17:33)  T(F): 98.2 (02 Apr 2024 10:42), Max: 99.9 (01 Apr 2024 17:33)  HR: 98 (02 Apr 2024 10:42) (91 - 105)  BP: 102/69 (02 Apr 2024 10:42) (99/62 - 107/72)  BP(mean): --  RR: 17 (02 Apr 2024 10:42) (17 - 18)  SpO2: 94% (02 Apr 2024 10:42) (93% - 97%)    Parameters below as of 02 Apr 2024 05:20  Patient On (Oxygen Delivery Method): room air        PHYSICAL EXAM:  GENERAL: NAD, well-groomed, well-developed  HEAD:  Atraumatic, Normocephalic  EYES: EOMI, PERRLA, conjunctiva and sclera clear  ENMT: No tonsillar erythema, exudates, or enlargement; Moist mucous membranes, Good dentition, No lesions  NECK: Supple, No JVD, Normal thyroid  NERVOUS SYSTEM:  Alert & Oriented X3, Good concentration; Motor Strength 5/5 B/L upper and lower extremities; DTRs 2+ intact and symmetric  CHEST/LUNG: Clear to auscultation bilaterally; No rales, rhonchi, wheezing, or rubs  HEART: Regular rate and rhythm; No murmurs, rubs, or gallops  ABDOMEN: Soft, Nontender, Nondistended; Bowel sounds present  EXTREMITIES:  2+ Peripheral Pulses, No clubbing, cyanosis, or edema  LYMPH: No lymphadenopathy noted  SKIN: No rashes or lesions    LABS:                        13.1   19.37 )-----------( 360      ( 02 Apr 2024 10:05 )             39.3     04-02    142  |  108  |  18  ----------------------------<  87  4.0   |  29  |  0.90    Ca    9.1      02 Apr 2024 10:05  Phos  2.4     04-02  Mg     2.3     04-02    TPro  7.0  /  Alb  2.5<L>  /  TBili  0.4  /  DBili  x   /  AST  22  /  ALT  37  /  AlkPhos  94  04-02    PT/INR - ( 31 Mar 2024 21:18 )   PT: 13.4 sec;   INR: 1.12 ratio         PTT - ( 31 Mar 2024 21:18 )  PTT:31.2 sec  Urinalysis Basic - ( 02 Apr 2024 10:05 )    Color: x / Appearance: x / SG: x / pH: x  Gluc: 87 mg/dL / Ketone: x  / Bili: x / Urobili: x   Blood: x / Protein: x / Nitrite: x   Leuk Esterase: x / RBC: x / WBC x   Sq Epi: x / Non Sq Epi: x / Bacteria: x       Culture - Urine (collected 31 Mar 2024 23:10)  Source: Clean Catch Clean Catch (Midstream)  Final Report (02 Apr 2024 07:30):    No growth    Culture - Blood (collected 31 Mar 2024 21:18)  Source: .Blood Blood-Peripheral  Preliminary Report (02 Apr 2024 01:02):    No growth at 24 hours    Culture - Blood (collected 31 Mar 2024 21:05)  Source: .Blood Blood-Peripheral  Preliminary Report (02 Apr 2024 01:02):    No growth at 24 hours      RADIOLOGY & ADDITIONAL TESTS:    04-01-24 @ 07:01  -  04-02-24 @ 07:00  --------------------------------------------------------  IN:  Total IN: 0 mL    OUT:    Voided (mL): 400 mL  Total OUT: 400 mL    Total NET: -400 mL       48 year old male with PMHx of anxiety and tobacco presented w/ right testicular pain and was admitted for sepsis POA secondary to R. epididymoorchitis. He is lying in bed in NAD.    MEDICATIONS  (STANDING):  cefTRIAXone   IVPB      cefTRIAXone   IVPB 1000 milliGRAM(s) IV Intermittent every 24 hours    MEDICATIONS  (PRN):  acetaminophen     Tablet .. 650 milliGRAM(s) Oral every 6 hours PRN Temp greater or equal to 38C (100.4F), Mild Pain (1 - 3)  aluminum hydroxide/magnesium hydroxide/simethicone Suspension 30 milliLiter(s) Oral every 4 hours PRN Dyspepsia  melatonin 3 milliGRAM(s) Oral at bedtime PRN Insomnia  ondansetron Injectable 4 milliGRAM(s) IV Push every 8 hours PRN Nausea and/or Vomiting      Allergies    No Known Allergies    Intolerances        Vital Signs Last 24 Hrs  T(C): 36.8 (02 Apr 2024 10:42), Max: 37.7 (01 Apr 2024 17:33)  T(F): 98.2 (02 Apr 2024 10:42), Max: 99.9 (01 Apr 2024 17:33)  HR: 98 (02 Apr 2024 10:42) (91 - 105)  BP: 102/69 (02 Apr 2024 10:42) (99/62 - 107/72)   RR: 17 (02 Apr 2024 10:42) (17 - 18)  SpO2: 94% (02 Apr 2024 10:42) (93% - 97%)    Parameters below as of 02 Apr 2024 05:20  Patient On (Oxygen Delivery Method): room air        PHYSICAL EXAM:  GENERAL: NAD, well-groomed, well-developed  HEAD:  Atraumatic, Normocephalic  EYES: EOMI, PERRLA   NECK: Supple   NERVOUS SYSTEM:  Alert & Oriented X3, Good concentration   CHEST/LUNG: Clear to auscultation bilaterally; No rales, rhonchi, wheezing, or rubs  HEART: Regular rate and rhythm; No murmurs, rubs, or gallops  ABDOMEN: Soft, Nontender, Nondistended; Bowel sounds present  EXTREMITIES: No clubbing, cyanosis, or edema       LABS:                        13.1   19.37 )-----------( 360      ( 02 Apr 2024 10:05 )             39.3     04-02    142  |  108  |  18  ----------------------------<  87  4.0   |  29  |  0.90    Ca    9.1      02 Apr 2024 10:05  Phos  2.4     04-02  Mg     2.3     04-02    TPro  7.0  /  Alb  2.5<L>  /  TBili  0.4  /  DBili  x   /  AST  22  /  ALT  37  /  AlkPhos  94  04-02    PT/INR - ( 31 Mar 2024 21:18 )   PT: 13.4 sec;   INR: 1.12 ratio         PTT - ( 31 Mar 2024 21:18 )  PTT:31.2 sec  Urinalysis Basic - ( 02 Apr 2024 10:05 )    Color: x / Appearance: x / SG: x / pH: x  Gluc: 87 mg/dL / Ketone: x  / Bili: x / Urobili: x   Blood: x / Protein: x / Nitrite: x   Leuk Esterase: x / RBC: x / WBC x   Sq Epi: x / Non Sq Epi: x / Bacteria: x       Culture - Urine (collected 31 Mar 2024 23:10)  Source: Clean Catch Clean Catch (Midstream)  Final Report (02 Apr 2024 07:30):    No growth    Culture - Blood (collected 31 Mar 2024 21:18)  Source: .Blood Blood-Peripheral  Preliminary Report (02 Apr 2024 01:02):    No growth at 24 hours    Culture - Blood (collected 31 Mar 2024 21:05)  Source: .Blood Blood-Peripheral  Preliminary Report (02 Apr 2024 01:02):    No growth at 24 hours      RADIOLOGY & ADDITIONAL TESTS:    04-01-24 @ 07:01  -  04-02-24 @ 07:00  --------------------------------------------------------  IN:  Total IN: 0 mL    OUT:    Voided (mL): 400 mL  Total OUT: 400 mL    Total NET: -400 mL       48 year old male with PMHx of anxiety and tobacco presented w/ right testicular pain and was admitted for sepsis POA secondary to R. epididymoorchitis. He is lying in bed in NAD.    MEDICATIONS  (STANDING):  cefTRIAXone   IVPB      cefTRIAXone   IVPB 1000 milliGRAM(s) IV Intermittent every 24 hours    MEDICATIONS  (PRN):  acetaminophen     Tablet .. 650 milliGRAM(s) Oral every 6 hours PRN Temp greater or equal to 38C (100.4F), Mild Pain (1 - 3)  aluminum hydroxide/magnesium hydroxide/simethicone Suspension 30 milliLiter(s) Oral every 4 hours PRN Dyspepsia  melatonin 3 milliGRAM(s) Oral at bedtime PRN Insomnia  ondansetron Injectable 4 milliGRAM(s) IV Push every 8 hours PRN Nausea and/or Vomiting      Allergies    No Known Allergies    Intolerances        Vital Signs Last 24 Hrs  T(C): 36.8 (02 Apr 2024 10:42), Max: 37.7 (01 Apr 2024 17:33)  T(F): 98.2 (02 Apr 2024 10:42), Max: 99.9 (01 Apr 2024 17:33)  HR: 98 (02 Apr 2024 10:42) (91 - 105)  BP: 102/69 (02 Apr 2024 10:42) (99/62 - 107/72)   RR: 17 (02 Apr 2024 10:42) (17 - 18)  SpO2: 94% (02 Apr 2024 10:42) (93% - 97%)    Parameters below as of 02 Apr 2024 05:20  Patient On (Oxygen Delivery Method): room air        PHYSICAL EXAM:  GENERAL: NAD, well-groomed, well-developed  HEAD:  Atraumatic, Normocephalic  EYES: EOMI, PERRLA   NECK: Supple   NERVOUS SYSTEM:  Alert & Oriented X3, Good concentration   CHEST/LUNG: Clear to auscultation bilaterally; No rales, rhonchi, wheezing, or rubs  HEART: Regular rate and rhythm; No murmurs, rubs, or gallops  ABDOMEN: Soft, Nontender, Nondistended; Bowel sounds present  : right testicle is mildly edematous but not tender  EXTREMITIES: No clubbing, cyanosis, or edema       LABS:                        13.1   19.37 )-----------( 360      ( 02 Apr 2024 10:05 )             39.3     04-02    142  |  108  |  18  ----------------------------<  87  4.0   |  29  |  0.90    Ca    9.1      02 Apr 2024 10:05  Phos  2.4     04-02  Mg     2.3     04-02    TPro  7.0  /  Alb  2.5<L>  /  TBili  0.4  /  DBili  x   /  AST  22  /  ALT  37  /  AlkPhos  94  04-02    PT/INR - ( 31 Mar 2024 21:18 )   PT: 13.4 sec;   INR: 1.12 ratio         PTT - ( 31 Mar 2024 21:18 )  PTT:31.2 sec  Urinalysis Basic - ( 02 Apr 2024 10:05 )    Color: x / Appearance: x / SG: x / pH: x  Gluc: 87 mg/dL / Ketone: x  / Bili: x / Urobili: x   Blood: x / Protein: x / Nitrite: x   Leuk Esterase: x / RBC: x / WBC x   Sq Epi: x / Non Sq Epi: x / Bacteria: x       Culture - Urine (collected 31 Mar 2024 23:10)  Source: Clean Catch Clean Catch (Midstream)  Final Report (02 Apr 2024 07:30):    No growth    Culture - Blood (collected 31 Mar 2024 21:18)  Source: .Blood Blood-Peripheral  Preliminary Report (02 Apr 2024 01:02):    No growth at 24 hours    Culture - Blood (collected 31 Mar 2024 21:05)  Source: .Blood Blood-Peripheral  Preliminary Report (02 Apr 2024 01:02):    No growth at 24 hours      RADIOLOGY & ADDITIONAL TESTS:    04-01-24 @ 07:01  -  04-02-24 @ 07:00  --------------------------------------------------------  IN:  Total IN: 0 mL    OUT:    Voided (mL): 400 mL  Total OUT: 400 mL    Total NET: -400 mL

## 2024-04-02 NOTE — PROGRESS NOTE ADULT - ASSESSMENT
3M with right epididymoorchitis.   Afebrile, Tachycardic overnight. Leukocytosis downtrending.       PLAN:     - Continue antibiotics, awaiting ID consult  - F/U UCx   - Continue care per primary team   - Discussed with Dr. Ozuna

## 2024-04-03 LAB
ANION GAP SERPL CALC-SCNC: 8 MMOL/L — SIGNIFICANT CHANGE UP (ref 5–17)
BUN SERPL-MCNC: 20 MG/DL — SIGNIFICANT CHANGE UP (ref 7–23)
CALCIUM SERPL-MCNC: 9 MG/DL — SIGNIFICANT CHANGE UP (ref 8.5–10.1)
CHLORIDE SERPL-SCNC: 110 MMOL/L — HIGH (ref 96–108)
CO2 SERPL-SCNC: 24 MMOL/L — SIGNIFICANT CHANGE UP (ref 22–31)
CREAT SERPL-MCNC: 0.67 MG/DL — SIGNIFICANT CHANGE UP (ref 0.5–1.3)
EGFR: 114 ML/MIN/1.73M2 — SIGNIFICANT CHANGE UP
GLUCOSE SERPL-MCNC: 115 MG/DL — HIGH (ref 70–99)
HCT VFR BLD CALC: 41 % — SIGNIFICANT CHANGE UP (ref 39–50)
HGB BLD-MCNC: 13.1 G/DL — SIGNIFICANT CHANGE UP (ref 13–17)
HIV 1+2 AB+HIV1 P24 AG SERPL QL IA: SIGNIFICANT CHANGE UP
MAGNESIUM SERPL-MCNC: 2.1 MG/DL — SIGNIFICANT CHANGE UP (ref 1.6–2.6)
MCHC RBC-ENTMCNC: 29.8 PG — SIGNIFICANT CHANGE UP (ref 27–34)
MCHC RBC-ENTMCNC: 32 G/DL — SIGNIFICANT CHANGE UP (ref 32–36)
MCV RBC AUTO: 93.2 FL — SIGNIFICANT CHANGE UP (ref 80–100)
NRBC # BLD: 0 /100 WBCS — SIGNIFICANT CHANGE UP (ref 0–0)
PHOSPHATE SERPL-MCNC: 3.5 MG/DL — SIGNIFICANT CHANGE UP (ref 2.5–4.5)
PLATELET # BLD AUTO: 348 K/UL — SIGNIFICANT CHANGE UP (ref 150–400)
POTASSIUM SERPL-MCNC: 4 MMOL/L — SIGNIFICANT CHANGE UP (ref 3.5–5.3)
POTASSIUM SERPL-SCNC: 4 MMOL/L — SIGNIFICANT CHANGE UP (ref 3.5–5.3)
RBC # BLD: 4.4 M/UL — SIGNIFICANT CHANGE UP (ref 4.2–5.8)
RBC # FLD: 13.2 % — SIGNIFICANT CHANGE UP (ref 10.3–14.5)
SODIUM SERPL-SCNC: 142 MMOL/L — SIGNIFICANT CHANGE UP (ref 135–145)
T PALLIDUM AB TITR SER: NEGATIVE — SIGNIFICANT CHANGE UP
WBC # BLD: 11.06 K/UL — HIGH (ref 3.8–10.5)
WBC # FLD AUTO: 11.06 K/UL — HIGH (ref 3.8–10.5)

## 2024-04-03 PROCEDURE — 99232 SBSQ HOSP IP/OBS MODERATE 35: CPT

## 2024-04-03 RX ADMIN — Medication 1 PATCH: at 10:18

## 2024-04-03 RX ADMIN — Medication 1 PATCH: at 20:20

## 2024-04-03 RX ADMIN — CEFTRIAXONE 100 MILLIGRAM(S): 500 INJECTION, POWDER, FOR SOLUTION INTRAMUSCULAR; INTRAVENOUS at 18:55

## 2024-04-03 RX ADMIN — ENOXAPARIN SODIUM 40 MILLIGRAM(S): 100 INJECTION SUBCUTANEOUS at 17:28

## 2024-04-03 RX ADMIN — Medication 2 PACKET(S): at 06:28

## 2024-04-03 NOTE — PROGRESS NOTE ADULT - ASSESSMENT
48 year old male with PMHx of anxiety and tobacco presented w/ right testicular pain and was admitted for sepsis POA secondary to R. epididymoorchitis.    Sepsis secondary to R. epididymoorchitis   - U/S testicles with R. epididymoorchitis   - NGTD on blood cultures  - urine culture negative  - chlamydia/gonorrhea NAAT pending   - urology following  - c/w Rocephin  - as per ID, increased Rocephin to 2gm    Hypophosphatemia  - replace w/ PO Phos    Tobacco use  - pt smokes 1 ppd x 30 years, not yet ready to quit  - as per H&P, he declined nicotine patch and Nicorette gum    Prophylaxis:  DVT: Lovenox  GI: PO diet

## 2024-04-03 NOTE — PROGRESS NOTE ADULT - NS ATTEND AMEND GEN_ALL_CORE FT
I have reviewed all pertinent clinical information and agree with the NP's note.  Labs, new imaging (if applicable) and vitals reviewed.  Agree with the above assessment and plan.    continue ceftriaxone   follow cultures   HIV and syphilis sent   duration and choice of antibiotics TBD   continue to offer ice and pain control    Discussed with Dr. Prem Hare, DO  Chief, Infectious Disease at [facility]  Reachable via [employer] Teams or ID office: [phone]  Weekdays: After 5pm, please call [phone] for all inquiries and new consults  Weekends: Message on-call infectious disease physician via teams (see Gary)

## 2024-04-03 NOTE — PROGRESS NOTE ADULT - SUBJECTIVE AND OBJECTIVE BOX
VAZQUEZ MAXWELL  MRN-[id number]    Follow Up:  epididymitis     Interval History: the pt was seen and examined earlier today, not in acute distress, somewhat emotional, appreciative. Pt is afebrile since 4/1, RA, WBC is improving 11.06, pt reports still having pain in his right testicle.     PAST MEDICAL & SURGICAL HISTORY:  Anxiety      Tobacco dependence with current use          ROS:    [ ] Unobtainable because:  [x ] All other systems negative    Constitutional: no fever, no chills  Head: no trauma  Eyes: no vision changes, no eye pain  ENT:  no sore throat, no rhinorrhea  Cardiovascular:  no chest pain, no palpitation  Respiratory:  no SOB, no cough  GI:  no abd pain, no vomiting, no diarrhea  urinary: no dysuria, no hematuria, no flank pain, right testicle pain   musculoskeletal:  no joint pain, no joint swelling  skin:  no rash  neurology:  no headache, no seizure, no change in mental status  psych: no anxiety, no depression         Allergies  No Known Allergies        ANTIMICROBIALS:  cefTRIAXone   IVPB 2000 every 24 hours      OTHER MEDS:  acetaminophen     Tablet .. 650 milliGRAM(s) Oral every 6 hours PRN  aluminum hydroxide/magnesium hydroxide/simethicone Suspension 30 milliLiter(s) Oral every 4 hours PRN  diphtheria/tetanus/pertussis (acellular) Vaccine (Adacel) 0.5 milliLiter(s) IntraMuscular once  enoxaparin Injectable 40 milliGRAM(s) SubCutaneous every 24 hours  melatonin 3 milliGRAM(s) Oral at bedtime PRN  nicotine -  14 mG/24Hr(s) Patch 1 Patch Transdermal daily  ondansetron Injectable 4 milliGRAM(s) IV Push every 8 hours PRN      Vital Signs Last 24 Hrs  T(C): 36.7 (03 Apr 2024 05:19), Max: 37.2 (02 Apr 2024 17:36)  T(F): 98.1 (03 Apr 2024 05:19), Max: 98.9 (02 Apr 2024 17:36)  HR: 70 (03 Apr 2024 05:19) (70 - 98)  BP: 103/69 (03 Apr 2024 05:19) (102/69 - 113/67)  BP(mean): --  RR: 17 (03 Apr 2024 05:19) (17 - 17)  SpO2: 96% (03 Apr 2024 05:19) (94% - 98%)    Parameters below as of 03 Apr 2024 05:19  Patient On (Oxygen Delivery Method): room air        Physical Exam:  Constitutional: non-toxic, no distress, RA  HEAD/EYES: anicteric, no conjunctival injection  ENT:  supple, no thrush  Cardiovascular:   normal S1, S2, no murmur, no edema  Respiratory:  clear BS bilaterally, no wheezes, no rales  GI:  soft, non-tender, normal bowel sounds  :  no andre, no CVA tenderness, mild erythema, with swelling and tender right testes   Musculoskeletal:  no synovitis, normal ROM  Neurologic: awake and alert, normal strength, no focal findings  Skin:  no rash, no erythema, no phlebitis  Heme/Onc: no lymphadenopathy   Psychiatric:  awake, alert, appropriate mood    WBC Count: 11.06 K/uL (04-03 @ 07:12)  WBC Count: 19.37 K/uL (04-02 @ 10:05)  WBC Count: 34.24 K/uL (04-01 @ 09:58)  WBC Count: 18.60 K/uL (03-31 @ 21:18)                            13.1   11.06 )-----------( 348      ( 03 Apr 2024 07:12 )             41.0       04-03    142  |  110<H>  |  20  ----------------------------<  115<H>  4.0   |  24  |  0.67    Ca    9.0      03 Apr 2024 07:12  Phos  3.5     04-03  Mg     2.1     04-03    TPro  7.0  /  Alb  2.5<L>  /  TBili  0.4  /  DBili  x   /  AST  22  /  ALT  37  /  AlkPhos  94  04-02      Urinalysis Basic - ( 03 Apr 2024 07:12 )    Color: x / Appearance: x / SG: x / pH: x  Gluc: 115 mg/dL / Ketone: x  / Bili: x / Urobili: x   Blood: x / Protein: x / Nitrite: x   Leuk Esterase: x / RBC: x / WBC x   Sq Epi: x / Non Sq Epi: x / Bacteria: x        Creatinine Trend: 0.67<--, 0.90<--, 1.13<--  Lactate, Blood: 1.3 mmol/L (04-02-24 @ 10:05)      MICROBIOLOGY:  v  Clean Catch Clean Catch (Midstream)  03-31-24   No growth  --  --      .Blood Blood-Peripheral  03-31-24   No growth at 48 Hours  --  --      .Blood Blood-Peripheral  03-31-24   No growth at 48 Hours  --  --    RADIOLOGY:

## 2024-04-03 NOTE — PROGRESS NOTE ADULT - SUBJECTIVE AND OBJECTIVE BOX
Patient seen and examined bedside resting comfortably.  R testicular pain and swelling remains the same. denies fevers, chills.     T(F): 98.7 (04-03-24 @ 11:41), Max: 98.9 (04-02-24 @ 17:36)  HR: 97 (04-03-24 @ 11:41) (70 - 97)  BP: 115/77 (04-03-24 @ 11:41) (103/69 - 115/77)  RR: 16 (04-03-24 @ 11:41) (16 - 17)  SpO2: 98% (04-03-24 @ 11:41) (96% - 98%)  Wt(kg): --  CAPILLARY BLOOD GLUCOSE          PHYSICAL EXAM:  General: NAD  Neuro:  Alert & oriented x 3  HEENT: NCAT, EOMI, conjunctiva clear  Lung:respirations nonlabored, good inspiratory effort  : circumcised phallus. R testicle mildly swollen, erythematous and with mild TTP  Extremities: no pedal edema or calf tenderness noted     LABS:                        13.1   11.06 )-----------( 348      ( 03 Apr 2024 07:12 )             41.0     04-03    142  |  110<H>  |  20  ----------------------------<  115<H>  4.0   |  24  |  0.67    Ca    9.0      03 Apr 2024 07:12  Phos  3.5     04-03  Mg     2.1     04-03    TPro  7.0  /  Alb  2.5<L>  /  TBili  0.4  /  DBili  x   /  AST  22  /  ALT  37  /  AlkPhos  94  04-02        I&O:     Culture Results:   No growth (03-31 @ 23:10)  Culture Results:   No growth at 48 Hours (03-31 @ 21:18)  Culture Results:   No growth at 48 Hours (03-31 @ 21:05)        A/P: 47 yo M here with R epididymoorchitis  afebrile, leukocytosis downtrending  Ucx no growth, bcx NGTD, GC negative  -appreciate ID consult, pending HIV and syphillis.   -c/w antibiotics per ID  -continue care per primary team  ...     Patient seen and examined bedside resting comfortably.  R testicular pain and swelling remains the same. denies fevers, chills.     T(F): 98.7 (04-03-24 @ 11:41), Max: 98.9 (04-02-24 @ 17:36)  HR: 97 (04-03-24 @ 11:41) (70 - 97)  BP: 115/77 (04-03-24 @ 11:41) (103/69 - 115/77)  RR: 16 (04-03-24 @ 11:41) (16 - 17)  SpO2: 98% (04-03-24 @ 11:41) (96% - 98%)  Wt(kg): --  CAPILLARY BLOOD GLUCOSE          PHYSICAL EXAM:  General: NAD  Neuro:  Alert & oriented x 3  HEENT: NCAT, EOMI, conjunctiva clear  Lung:respirations nonlabored, good inspiratory effort  : circumcised phallus. R testicle mildly swollen, erythematous and with mild TTP  Extremities: no pedal edema or calf tenderness noted     LABS:                        13.1   11.06 )-----------( 348      ( 03 Apr 2024 07:12 )             41.0     04-03    142  |  110<H>  |  20  ----------------------------<  115<H>  4.0   |  24  |  0.67    Ca    9.0      03 Apr 2024 07:12  Phos  3.5     04-03  Mg     2.1     04-03    TPro  7.0  /  Alb  2.5<L>  /  TBili  0.4  /  DBili  x   /  AST  22  /  ALT  37  /  AlkPhos  94  04-02        I&O:     Culture Results:   No growth (03-31 @ 23:10)  Culture Results:   No growth at 48 Hours (03-31 @ 21:18)  Culture Results:   No growth at 48 Hours (03-31 @ 21:05)        A/P: 49 yo M here with R epididymoorchitis  afebrile, leukocytosis downtrending  Ucx no growth, bcx NGTD, GC negative  appreciate ID consult, pending HIV and syphillis.   -c/w antibiotics per ID  -continue care per primary team  -case discussed with Dr Artis

## 2024-04-03 NOTE — PROGRESS NOTE ADULT - ASSESSMENT
Justin Torres is a 48 year old male with PMHx of anxiety and tobacco use who presented to the ED on 3/31/24 for complaints of right testicular pain.  Fever, tachycardia and neutrophilic Leukocytosis   exam consistent with epididymitis   gc/chlamydia negative, denies high risk sexual behavior     4/3: no fevers since 4/1, RA, WBC is trending down 11.06, BCs and UC with no growth to date, Cr ok, on exam right testicle with mild erythema, swelling is present and area is tender, Ceftriaxone IV continued. HIV and Syphilis screens are pending.     Plan:  continue ceftriaxone 2g q24rhs   follow urine culture - no growth   follow blood cultures - NGTD  send HIV - pending   send syphilis - pending  place bag of ice under testes to elevate - spoke with RN  nicotine patch to help with cravings   smoking cessation counselled- continue to encourage quitting and offer [facility]   TDAP before discharge  pain control

## 2024-04-03 NOTE — PROGRESS NOTE ADULT - SUBJECTIVE AND OBJECTIVE BOX
Patient is a 48y old  Male who presents with a chief complaint of Sepsis secondary to R. epididymo-orchitis (2024 12:18)    INTERVAL HPI/OVERNIGHT EVENTS:    MEDICATIONS  (STANDING):  cefTRIAXone   IVPB 2000 milliGRAM(s) IV Intermittent every 24 hours  diphtheria/tetanus/pertussis (acellular) Vaccine (Adacel) 0.5 milliLiter(s) IntraMuscular once  enoxaparin Injectable 40 milliGRAM(s) SubCutaneous every 24 hours  nicotine -  14 mG/24Hr(s) Patch 1 Patch Transdermal daily    MEDICATIONS  (PRN):  acetaminophen     Tablet .. 650 milliGRAM(s) Oral every 6 hours PRN Temp greater or equal to 38C (100.4F), Mild Pain (1 - 3)  aluminum hydroxide/magnesium hydroxide/simethicone Suspension 30 milliLiter(s) Oral every 4 hours PRN Dyspepsia  melatonin 3 milliGRAM(s) Oral at bedtime PRN Insomnia  ondansetron Injectable 4 milliGRAM(s) IV Push every 8 hours PRN Nausea and/or Vomiting    Allergies    No Known Allergies    Intolerances      REVIEW OF SYSTEMS:  All other systems reviewed and are negative    Vital Signs Last 24 Hrs  T(C): 37.1 (2024 11:41), Max: 37.2 (2024 17:36)  T(F): 98.7 (2024 11:41), Max: 98.9 (2024 17:36)  HR: 97 (2024 11:41) (70 - 97)  BP: 115/77 (2024 11:41) (103/69 - 115/77)  BP(mean): --  RR: 16 (2024 11:41) (16 - 17)  SpO2: 98% (2024 11:41) (96% - 98%)    Parameters below as of 2024 11:41  Patient On (Oxygen Delivery Method): room air      Daily     Daily Weight in k.2 (2024 05:19)  I&O's Summary    2024 07:01  -  2024 07:00  --------------------------------------------------------  IN: 240 mL / OUT: 800 mL / NET: -560 mL      CAPILLARY BLOOD GLUCOSE        PHYSICAL EXAM:  GENERAL: NAD,    HEAD:  Atraumatic, Normocephalic  EYES: EOMI, PERRLA, conjunctiva and sclera clear  ENMT: No tonsillar erythema, exudates, or enlargement; Moist mucous membranes, Good dentition, No lesions  NECK: Supple, No JVD, Normal thyroid  NERVOUS SYSTEM:  Alert & Oriented X3, Good concentration; Motor Strength 5/5 B/L upper and lower extremities; DTRs 2+ intact and symmetric  CHEST/LUNG: Clear to percussion bilaterally; No rales, rhonchi, wheezing, or rubs  HEART: Regular rate and rhythm; No murmurs, rubs, or gallops  ABDOMEN: Soft, Nontender, Nondistended; Bowel sounds present  EXTREMITIES:  2+ Peripheral Pulses, No clubbing, cyanosis, or edema  LYMPH: No lymphadenopathy noted  SKIN: No rashes or lesions    Labs                          13.1   11.06 )-----------( 348      ( 2024 07:12 )             41.0     04-03    142  |  110<H>  |  20  ----------------------------<  115<H>  4.0   |  24  |  0.67    Ca    9.0      2024 07:12  Phos  3.5     04-03  Mg     2.1     04-03    TPro  7.0  /  Alb  2.5<L>  /  TBili  0.4  /  DBili  x   /  AST  22  /  ALT  37  /  AlkPhos  94  04-02          Urinalysis Basic - ( 2024 07:12 )    Color: x / Appearance: x / SG: x / pH: x  Gluc: 115 mg/dL / Ketone: x  / Bili: x / Urobili: x   Blood: x / Protein: x / Nitrite: x   Leuk Esterase: x / RBC: x / WBC x   Sq Epi: x / Non Sq Epi: x / Bacteria: x        Culture - Urine (collected 31 Mar 2024 23:10)  Source: Clean Catch Clean Catch (Midstream)  Final Report (2024 07:30):    No growth    Culture - Blood (collected 31 Mar 2024 21:18)  Source: .Blood Blood-Peripheral  Preliminary Report (2024 01:02):    No growth at 48 Hours    Culture - Blood (collected 31 Mar 2024 21:05)  Source: .Blood Blood-Peripheral  Preliminary Report (2024 01:02):    No growth at 48 Hours                DVT prophylaxis: > Lovenox 40mg SQ daily  > Heparin   > SCD's

## 2024-04-03 NOTE — PROGRESS NOTE ADULT - REASON FOR ADMISSION
Sepsis secondary to R. epididymo-orchitis

## 2024-04-04 ENCOUNTER — TRANSCRIPTION ENCOUNTER (OUTPATIENT)
Age: 49
End: 2024-04-04

## 2024-04-04 VITALS
SYSTOLIC BLOOD PRESSURE: 101 MMHG | TEMPERATURE: 98 F | RESPIRATION RATE: 17 BRPM | DIASTOLIC BLOOD PRESSURE: 68 MMHG | HEART RATE: 71 BPM | OXYGEN SATURATION: 97 %

## 2024-04-04 PROCEDURE — 99239 HOSP IP/OBS DSCHRG MGMT >30: CPT

## 2024-04-04 RX ORDER — CIPROFLOXACIN LACTATE 400MG/40ML
1 VIAL (ML) INTRAVENOUS
Qty: 20 | Refills: 0
Start: 2024-04-04 | End: 2024-04-13

## 2024-04-04 RX ADMIN — Medication 1 PATCH: at 09:11

## 2024-04-04 RX ADMIN — TETANUS TOXOID, REDUCED DIPHTHERIA TOXOID AND ACELLULAR PERTUSSIS VACCINE, ADSORBED 0.5 MILLILITER(S): 5; 2.5; 8; 8; 2.5 SUSPENSION INTRAMUSCULAR at 10:29

## 2024-04-04 NOTE — DISCHARGE NOTE NURSING/CASE MANAGEMENT/SOCIAL WORK - NSDCPEFALRISK_GEN_ALL_CORE
For information on Fall & Injury Prevention, visit: [email or web address]  [email or web address] OR  https://www.cdc.gov/steadi/patient.html

## 2024-04-04 NOTE — DISCHARGE NOTE NURSING/CASE MANAGEMENT/SOCIAL WORK - PATIENT PORTAL LINK FT
You can access the FollowMyHealth Patient Portal offered by [facility] by registering at the following website: [email or web address]. By joining [facility]’s FollowMyHealth portal, you will also be able to view your health information using other applications (apps) compatible with our system.

## 2024-04-04 NOTE — DISCHARGE NOTE PROVIDER - HOSPITAL COURSE
48 year old male with PMHx of anxiety and tobacco presented w/ right testicular pain and was admitted for sepsis POA secondary to R. epididymoorchitis.    Sepsis secondary to R. epididymoorchitis   - U/S testicles with R. epididymoorchitis   - NGTD on blood cultures  - urine culture negative  - chlamydia/gonorrhea hiv neg    dc on po cipro for 10more days     Hypophosphatemia/hypokalemia   - replaced w/ PO Phos    Tobacco use  - pt smokes 1 ppd x 30 years, not yet ready to quit      pt seen and examined 45 min spent on dc planning     Lab test review, Radiology Review, Vitals review, Consultant review and discussion, Physical examination, IDR, Assessment and plan; Plan discussion with patient and family

## 2024-04-04 NOTE — DISCHARGE NOTE PROVIDER - NSDCCPCAREPLAN_GEN_ALL_CORE_FT
PRINCIPAL DISCHARGE DIAGNOSIS  Diagnosis: Epididymoorchitis  Assessment and Plan of Treatment: complete 10 more days of antibiotics

## 2024-04-06 LAB
CULTURE RESULTS: SIGNIFICANT CHANGE UP
CULTURE RESULTS: SIGNIFICANT CHANGE UP
SPECIMEN SOURCE: SIGNIFICANT CHANGE UP
SPECIMEN SOURCE: SIGNIFICANT CHANGE UP

## 2024-04-08 DIAGNOSIS — N45.3 EPIDIDYMO-ORCHITIS: ICD-10-CM

## 2024-04-08 DIAGNOSIS — F17.200 NICOTINE DEPENDENCE, UNSPECIFIED, UNCOMPLICATED: ICD-10-CM

## 2024-04-08 DIAGNOSIS — E87.6 HYPOKALEMIA: ICD-10-CM

## 2024-04-08 DIAGNOSIS — E83.39 OTHER DISORDERS OF PHOSPHORUS METABOLISM: ICD-10-CM

## 2024-04-08 DIAGNOSIS — A41.9 SEPSIS, UNSPECIFIED ORGANISM: ICD-10-CM

## 2024-04-08 DIAGNOSIS — F41.9 ANXIETY DISORDER, UNSPECIFIED: ICD-10-CM

## 2024-09-18 ENCOUNTER — INPATIENT (INPATIENT)
Facility: HOSPITAL | Age: 49
LOS: 12 days | Discharge: HOME HEALTH | End: 2024-10-01
Attending: SURGERY | Admitting: SURGERY
Payer: COMMERCIAL

## 2024-09-18 VITALS
SYSTOLIC BLOOD PRESSURE: 114 MMHG | TEMPERATURE: 100 F | HEART RATE: 113 BPM | WEIGHT: 179.46 LBS | DIASTOLIC BLOOD PRESSURE: 82 MMHG | RESPIRATION RATE: 16 BRPM

## 2024-09-18 LAB
APPEARANCE UR: CLEAR — SIGNIFICANT CHANGE UP
BASOPHILS # BLD AUTO: 0.04 K/UL — SIGNIFICANT CHANGE UP (ref 0–0.2)
BASOPHILS NFR BLD AUTO: 0.2 % — SIGNIFICANT CHANGE UP (ref 0–2)
BILIRUB UR-MCNC: ABNORMAL
COLOR SPEC: ABNORMAL
DIFF PNL FLD: NEGATIVE — SIGNIFICANT CHANGE UP
EOSINOPHIL # BLD AUTO: 0.04 K/UL — SIGNIFICANT CHANGE UP (ref 0–0.5)
EOSINOPHIL NFR BLD AUTO: 0.2 % — SIGNIFICANT CHANGE UP (ref 0–6)
GLUCOSE UR QL: NEGATIVE MG/DL — SIGNIFICANT CHANGE UP
HCT VFR BLD CALC: 50.4 % — HIGH (ref 39–50)
HGB BLD-MCNC: 16.8 G/DL — SIGNIFICANT CHANGE UP (ref 13–17)
IMM GRANULOCYTES NFR BLD AUTO: 0.3 % — SIGNIFICANT CHANGE UP (ref 0–0.9)
KETONES UR-MCNC: 40 MG/DL
LACTATE SERPL-SCNC: 1.5 MMOL/L — SIGNIFICANT CHANGE UP (ref 0.7–2)
LEUKOCYTE ESTERASE UR-ACNC: ABNORMAL
LYMPHOCYTES # BLD AUTO: 1.87 K/UL — SIGNIFICANT CHANGE UP (ref 1–3.3)
LYMPHOCYTES # BLD AUTO: 11.7 % — LOW (ref 13–44)
MCHC RBC-ENTMCNC: 31.2 PG — SIGNIFICANT CHANGE UP (ref 27–34)
MCHC RBC-ENTMCNC: 33.3 G/DL — SIGNIFICANT CHANGE UP (ref 32–36)
MCV RBC AUTO: 93.7 FL — SIGNIFICANT CHANGE UP (ref 80–100)
MONOCYTES # BLD AUTO: 0.69 K/UL — SIGNIFICANT CHANGE UP (ref 0–0.9)
MONOCYTES NFR BLD AUTO: 4.3 % — SIGNIFICANT CHANGE UP (ref 2–14)
NEUTROPHILS # BLD AUTO: 13.36 K/UL — HIGH (ref 1.8–7.4)
NEUTROPHILS NFR BLD AUTO: 83.3 % — HIGH (ref 43–77)
NITRITE UR-MCNC: POSITIVE
NRBC # BLD: 0 /100 WBCS — SIGNIFICANT CHANGE UP (ref 0–0)
PH UR: 5 — SIGNIFICANT CHANGE UP (ref 5–8)
PLATELET # BLD AUTO: 354 K/UL — SIGNIFICANT CHANGE UP (ref 150–400)
PROT UR-MCNC: 30 MG/DL
RBC # BLD: 5.38 M/UL — SIGNIFICANT CHANGE UP (ref 4.2–5.8)
RBC # FLD: 13.3 % — SIGNIFICANT CHANGE UP (ref 10.3–14.5)
SP GR SPEC: >1.03 — HIGH (ref 1–1.03)
UROBILINOGEN FLD QL: 1 MG/DL — SIGNIFICANT CHANGE UP (ref 0.2–1)
WBC # BLD: 16.05 K/UL — HIGH (ref 3.8–10.5)
WBC # FLD AUTO: 16.05 K/UL — HIGH (ref 3.8–10.5)

## 2024-09-18 PROCEDURE — 99285 EMERGENCY DEPT VISIT HI MDM: CPT

## 2024-09-18 RX ORDER — IOHEXOL 350 MG/ML
30 INJECTION, SOLUTION INTRAVENOUS ONCE
Refills: 0 | Status: COMPLETED | OUTPATIENT
Start: 2024-09-18 | End: 2024-09-19

## 2024-09-18 RX ORDER — SODIUM CHLORIDE 0.9 % (FLUSH) 0.9 %
1000 SYRINGE (ML) INJECTION ONCE
Refills: 0 | Status: COMPLETED | OUTPATIENT
Start: 2024-09-18 | End: 2024-09-18

## 2024-09-18 RX ORDER — MORPHINE SULFATE 30 MG/1
4 TABLET, FILM COATED, EXTENDED RELEASE ORAL ONCE
Refills: 0 | Status: DISCONTINUED | OUTPATIENT
Start: 2024-09-18 | End: 2024-09-18

## 2024-09-18 RX ORDER — METOCLOPRAMIDE HCL 5 MG
10 TABLET ORAL ONCE
Refills: 0 | Status: COMPLETED | OUTPATIENT
Start: 2024-09-18 | End: 2024-09-18

## 2024-09-18 RX ORDER — FAMOTIDINE 40 MG
20 TABLET ORAL ONCE
Refills: 0 | Status: COMPLETED | OUTPATIENT
Start: 2024-09-18 | End: 2024-09-18

## 2024-09-18 RX ORDER — ACETAMINOPHEN 325 MG
975 TABLET ORAL ONCE
Refills: 0 | Status: COMPLETED | OUTPATIENT
Start: 2024-09-18 | End: 2024-09-18

## 2024-09-18 RX ADMIN — MORPHINE SULFATE 4 MILLIGRAM(S): 30 TABLET, FILM COATED, EXTENDED RELEASE ORAL at 23:51

## 2024-09-18 RX ADMIN — Medication 10 MILLIGRAM(S): at 23:50

## 2024-09-18 RX ADMIN — Medication 1000 MILLILITER(S): at 23:53

## 2024-09-18 RX ADMIN — Medication 20 MILLIGRAM(S): at 23:50

## 2024-09-18 NOTE — ED PROVIDER NOTE - OBJECTIVE STATEMENT
47 yo M with lower abd pain, L testicular pain for 3 days, constipation for 1 day with associated vomiting today all day, unable to tolerate po intake today.  Pain localized to lower abd, but more consistent in LLQ.  Pt. was concerned about L testicle as he had orchitis before, but this is not as bad as the pain/swelling from orchitis last time.  +chills while in ER tonight.  No trauma or other inciting event.   ROS: negative for cough, headache, chest pain, shortness of breath, diarrhea, rash, paresthesia, and focal weakness--all other systems reviewed are negative.   PMH: R epididymoorchitis in past with associated sepsis; Meds: Tylenol for pain thus far; SH: Denies drinking/drug use, + tobacco use

## 2024-09-18 NOTE — ED PROVIDER NOTE - CARE PLAN
Principal Discharge DX:	Nausea & vomiting  Secondary Diagnosis:	Bilateral lower abdominal pain  Secondary Diagnosis:	Left testicular pain   1 Principal Discharge DX:	Nausea & vomiting  Secondary Diagnosis:	Bilateral lower abdominal pain  Secondary Diagnosis:	Left testicular pain  Secondary Diagnosis:	Small bowel obstruction   normal Normal Screen- (No follow-up needed)

## 2024-09-18 NOTE — ED PROVIDER NOTE - PROGRESS NOTE DETAILS
CT concerning for bowel obstruction with fistula formation  will admit to surgery, they plan to take pt. to the OR within the hour, nursing aware, pt. informed of dx and plan of care

## 2024-09-18 NOTE — ED PROVIDER NOTE - CLINICAL SUMMARY MEDICAL DECISION MAKING FREE TEXT BOX
49 yo M with n/v, lower abd pain, L testicular tenderness, symptoms concerning for bowel obstruction, renal stones, uti, STI, orchitis/epididymitis, doubt bacteremia/sepsis  -cbc, cmp, type and screen, blood cx, ua/cx, lactate, lipase, CT ab/pel, US scrotum, ekg, iv, hydration, tylenol for pain/fever, pepcid/reglan for n/v, morphine for pain, npo, monitor  -f/u results, reeval

## 2024-09-18 NOTE — ED ADULT TRIAGE NOTE - CHIEF COMPLAINT QUOTE
Patient states that he had no taste and lower abd pain, lest testicular swelling x 3-4 days. Admitted for orchiditis and epididymitis in april

## 2024-09-18 NOTE — ED PROVIDER NOTE - PHYSICAL EXAMINATION
Vitals: tachy at 113, borderline temp of 113  Gen: AAOx3, NAD, sitting uncomfortably in stretcher, non-toxic, responsive to questions   Head: ncat, perrla, eomi b/l  Neck: supple, no lymphadenopathy, no midline deviation  Heart: rrr, no m/r/g  Lungs: CTA b/l, no rales/ronchi/wheezes  Abd: soft, +tender in lower abd, non-distended, no rebound or guarding  Ext: no clubbing/cyanosis/edema  Neuro: sensation and muscle strength intact b/l  genital: L testicular tenderness to palpation, but no significant swelling, no genital rash, no scrotal edema, no penile discharge

## 2024-09-19 ENCOUNTER — RESULT REVIEW (OUTPATIENT)
Age: 49
End: 2024-09-19

## 2024-09-19 DIAGNOSIS — K56.609 UNSPECIFIED INTESTINAL OBSTRUCTION, UNSPECIFIED AS TO PARTIAL VERSUS COMPLETE OBSTRUCTION: ICD-10-CM

## 2024-09-19 PROBLEM — F17.200 NICOTINE DEPENDENCE, UNSPECIFIED, UNCOMPLICATED: Chronic | Status: ACTIVE | Noted: 2024-04-01

## 2024-09-19 PROBLEM — F41.9 ANXIETY DISORDER, UNSPECIFIED: Chronic | Status: ACTIVE | Noted: 2024-04-01

## 2024-09-19 LAB
ALBUMIN SERPL ELPH-MCNC: 2.7 G/DL — LOW (ref 3.3–5)
ALBUMIN SERPL ELPH-MCNC: 3.8 G/DL — SIGNIFICANT CHANGE UP (ref 3.3–5)
ALP SERPL-CCNC: 119 U/L — SIGNIFICANT CHANGE UP (ref 40–120)
ALP SERPL-CCNC: 85 U/L — SIGNIFICANT CHANGE UP (ref 40–120)
ALT FLD-CCNC: 18 U/L — SIGNIFICANT CHANGE UP (ref 12–78)
ALT FLD-CCNC: 27 U/L — SIGNIFICANT CHANGE UP (ref 12–78)
ANION GAP SERPL CALC-SCNC: 10 MMOL/L — SIGNIFICANT CHANGE UP (ref 5–17)
ANION GAP SERPL CALC-SCNC: 3 MMOL/L — LOW (ref 5–17)
APTT BLD: 27.4 SEC — SIGNIFICANT CHANGE UP (ref 24.5–35.6)
AST SERPL-CCNC: 12 U/L — LOW (ref 15–37)
AST SERPL-CCNC: 15 U/L — SIGNIFICANT CHANGE UP (ref 15–37)
BACTERIA # UR AUTO: ABNORMAL /HPF
BASOPHILS # BLD AUTO: 0.04 K/UL — SIGNIFICANT CHANGE UP (ref 0–0.2)
BASOPHILS NFR BLD AUTO: 0.3 % — SIGNIFICANT CHANGE UP (ref 0–2)
BILIRUB SERPL-MCNC: 0.8 MG/DL — SIGNIFICANT CHANGE UP (ref 0.2–1.2)
BILIRUB SERPL-MCNC: 1 MG/DL — SIGNIFICANT CHANGE UP (ref 0.2–1.2)
BLD GP AB SCN SERPL QL: SIGNIFICANT CHANGE UP
BUN SERPL-MCNC: 16 MG/DL — SIGNIFICANT CHANGE UP (ref 7–23)
BUN SERPL-MCNC: 22 MG/DL — SIGNIFICANT CHANGE UP (ref 7–23)
CALCIUM SERPL-MCNC: 8 MG/DL — LOW (ref 8.5–10.1)
CALCIUM SERPL-MCNC: 9.5 MG/DL — SIGNIFICANT CHANGE UP (ref 8.5–10.1)
CHLORIDE SERPL-SCNC: 101 MMOL/L — SIGNIFICANT CHANGE UP (ref 96–108)
CHLORIDE SERPL-SCNC: 107 MMOL/L — SIGNIFICANT CHANGE UP (ref 96–108)
CO2 SERPL-SCNC: 26 MMOL/L — SIGNIFICANT CHANGE UP (ref 22–31)
CO2 SERPL-SCNC: 27 MMOL/L — SIGNIFICANT CHANGE UP (ref 22–31)
COMMENT - URINE: SIGNIFICANT CHANGE UP
CREAT SERPL-MCNC: 0.79 MG/DL — SIGNIFICANT CHANGE UP (ref 0.5–1.3)
CREAT SERPL-MCNC: 1.14 MG/DL — SIGNIFICANT CHANGE UP (ref 0.5–1.3)
EGFR: 108 ML/MIN/1.73M2 — SIGNIFICANT CHANGE UP
EGFR: 78 ML/MIN/1.73M2 — SIGNIFICANT CHANGE UP
EOSINOPHIL # BLD AUTO: 0.01 K/UL — SIGNIFICANT CHANGE UP (ref 0–0.5)
EOSINOPHIL NFR BLD AUTO: 0.1 % — SIGNIFICANT CHANGE UP (ref 0–6)
EPI CELLS # UR: PRESENT
GAS PNL BLDA: SIGNIFICANT CHANGE UP
GLUCOSE BLDC GLUCOMTR-MCNC: 102 MG/DL — HIGH (ref 70–99)
GLUCOSE BLDC GLUCOMTR-MCNC: 104 MG/DL — HIGH (ref 70–99)
GLUCOSE SERPL-MCNC: 131 MG/DL — HIGH (ref 70–99)
GLUCOSE SERPL-MCNC: 133 MG/DL — HIGH (ref 70–99)
HCT VFR BLD CALC: 42.9 % — SIGNIFICANT CHANGE UP (ref 39–50)
HGB BLD-MCNC: 14.3 G/DL — SIGNIFICANT CHANGE UP (ref 13–17)
IMM GRANULOCYTES NFR BLD AUTO: 0.3 % — SIGNIFICANT CHANGE UP (ref 0–0.9)
INR BLD: 0.99 RATIO — SIGNIFICANT CHANGE UP (ref 0.85–1.18)
LACTATE SERPL-SCNC: 1.7 MMOL/L — SIGNIFICANT CHANGE UP (ref 0.7–2)
LIDOCAIN IGE QN: 14 U/L — SIGNIFICANT CHANGE UP (ref 13–75)
LYMPHOCYTES # BLD AUTO: 15 % — SIGNIFICANT CHANGE UP (ref 13–44)
LYMPHOCYTES # BLD AUTO: 2.14 K/UL — SIGNIFICANT CHANGE UP (ref 1–3.3)
MAGNESIUM SERPL-MCNC: 1.7 MG/DL — SIGNIFICANT CHANGE UP (ref 1.6–2.6)
MCHC RBC-ENTMCNC: 31.4 PG — SIGNIFICANT CHANGE UP (ref 27–34)
MCHC RBC-ENTMCNC: 33.3 G/DL — SIGNIFICANT CHANGE UP (ref 32–36)
MCV RBC AUTO: 94.3 FL — SIGNIFICANT CHANGE UP (ref 80–100)
MONOCYTES # BLD AUTO: 0.73 K/UL — SIGNIFICANT CHANGE UP (ref 0–0.9)
MONOCYTES NFR BLD AUTO: 5.1 % — SIGNIFICANT CHANGE UP (ref 2–14)
NEUTROPHILS # BLD AUTO: 11.29 K/UL — HIGH (ref 1.8–7.4)
NEUTROPHILS NFR BLD AUTO: 79.2 % — HIGH (ref 43–77)
NRBC # BLD: 0 /100 WBCS — SIGNIFICANT CHANGE UP (ref 0–0)
PHOSPHATE SERPL-MCNC: 3.5 MG/DL — SIGNIFICANT CHANGE UP (ref 2.5–4.5)
PLATELET # BLD AUTO: 277 K/UL — SIGNIFICANT CHANGE UP (ref 150–400)
POTASSIUM SERPL-MCNC: 3.7 MMOL/L — SIGNIFICANT CHANGE UP (ref 3.5–5.3)
POTASSIUM SERPL-MCNC: 4.2 MMOL/L — SIGNIFICANT CHANGE UP (ref 3.5–5.3)
POTASSIUM SERPL-SCNC: 3.7 MMOL/L — SIGNIFICANT CHANGE UP (ref 3.5–5.3)
POTASSIUM SERPL-SCNC: 4.2 MMOL/L — SIGNIFICANT CHANGE UP (ref 3.5–5.3)
PROCALCITONIN SERPL-MCNC: 0.08 NG/ML — SIGNIFICANT CHANGE UP (ref 0.02–0.1)
PROT SERPL-MCNC: 5.3 GM/DL — LOW (ref 6–8.3)
PROT SERPL-MCNC: 7.6 GM/DL — SIGNIFICANT CHANGE UP (ref 6–8.3)
PROTHROM AB SERPL-ACNC: 11.8 SEC — SIGNIFICANT CHANGE UP (ref 9.5–13)
RBC # BLD: 4.55 M/UL — SIGNIFICANT CHANGE UP (ref 4.2–5.8)
RBC # FLD: 13.3 % — SIGNIFICANT CHANGE UP (ref 10.3–14.5)
RBC CASTS # UR COMP ASSIST: 3 /HPF — SIGNIFICANT CHANGE UP (ref 0–4)
SODIUM SERPL-SCNC: 136 MMOL/L — SIGNIFICANT CHANGE UP (ref 135–145)
SODIUM SERPL-SCNC: 138 MMOL/L — SIGNIFICANT CHANGE UP (ref 135–145)
WBC # BLD: 14.25 K/UL — HIGH (ref 3.8–10.5)
WBC # FLD AUTO: 14.25 K/UL — HIGH (ref 3.8–10.5)
WBC UR QL: 1 /HPF — SIGNIFICANT CHANGE UP (ref 0–5)

## 2024-09-19 PROCEDURE — 44661 REPAIR BOWEL-BLADDER FISTULA: CPT

## 2024-09-19 PROCEDURE — 88307 TISSUE EXAM BY PATHOLOGIST: CPT | Mod: 26

## 2024-09-19 PROCEDURE — 74177 CT ABD & PELVIS W/CONTRAST: CPT | Mod: 26,MC

## 2024-09-19 PROCEDURE — 44143 PARTIAL REMOVAL OF COLON: CPT | Mod: AS,59

## 2024-09-19 PROCEDURE — 44660 REPAIR BOWEL-BLADDER FISTULA: CPT | Mod: AS

## 2024-09-19 PROCEDURE — 76870 US EXAM SCROTUM: CPT | Mod: 26

## 2024-09-19 PROCEDURE — 44143 PARTIAL REMOVAL OF COLON: CPT | Mod: 59

## 2024-09-19 PROCEDURE — 43753 TX GASTRO INTUB W/ASP: CPT

## 2024-09-19 PROCEDURE — 71045 X-RAY EXAM CHEST 1 VIEW: CPT | Mod: 26

## 2024-09-19 PROCEDURE — 99222 1ST HOSP IP/OBS MODERATE 55: CPT | Mod: 25,57

## 2024-09-19 PROCEDURE — 99223 1ST HOSP IP/OBS HIGH 75: CPT | Mod: 57

## 2024-09-19 PROCEDURE — 93010 ELECTROCARDIOGRAM REPORT: CPT

## 2024-09-19 PROCEDURE — 99291 CRITICAL CARE FIRST HOUR: CPT

## 2024-09-19 DEVICE — STAPLER COVIDIEN TRI-STAPLE 60MM BLACK RELOAD: Type: IMPLANTABLE DEVICE | Status: FUNCTIONAL

## 2024-09-19 RX ORDER — PIPERACILLIN SODIUM AND TAZOBACTAM SODIUM 12; 1.5 G/60ML; G/60ML
3.38 INJECTION, POWDER, LYOPHILIZED, FOR SOLUTION INTRAVENOUS ONCE
Refills: 0 | Status: COMPLETED | OUTPATIENT
Start: 2024-09-19 | End: 2024-09-19

## 2024-09-19 RX ORDER — CHLORHEXIDINE GLUCONATE ORAL RINSE 1.2 MG/ML
1 SOLUTION DENTAL
Refills: 0 | Status: DISCONTINUED | OUTPATIENT
Start: 2024-09-19 | End: 2024-10-01

## 2024-09-19 RX ORDER — SODIUM CHLORIDE IRRIG SOLUTION 0.9 %
1000 SOLUTION, IRRIGATION IRRIGATION ONCE
Refills: 0 | Status: COMPLETED | OUTPATIENT
Start: 2024-09-19 | End: 2024-09-19

## 2024-09-19 RX ORDER — MIDAZOLAM HCL 1 MG/ML
4 VIAL (ML) INJECTION ONCE
Refills: 0 | Status: DISCONTINUED | OUTPATIENT
Start: 2024-09-19 | End: 2024-09-19

## 2024-09-19 RX ORDER — PANTOPRAZOLE SODIUM 40 MG/1
40 TABLET, DELAYED RELEASE ORAL DAILY
Refills: 0 | Status: DISCONTINUED | OUTPATIENT
Start: 2024-09-19 | End: 2024-09-21

## 2024-09-19 RX ORDER — FENTANYL CITRATE-0.9 % NACL/PF 300MCG/30
100 PATIENT CONTROLLED ANALGESIA VIAL INJECTION ONCE
Refills: 0 | Status: DISCONTINUED | OUTPATIENT
Start: 2024-09-19 | End: 2024-09-19

## 2024-09-19 RX ORDER — HYDROMORPHONE HYDROCHLORIDE 1 MG/ML
0.5 INJECTION, SOLUTION INTRAMUSCULAR; INTRAVENOUS; SUBCUTANEOUS EVERY 4 HOURS
Refills: 0 | Status: DISCONTINUED | OUTPATIENT
Start: 2024-09-19 | End: 2024-09-21

## 2024-09-19 RX ORDER — SODIUM CHLORIDE IRRIG SOLUTION 0.9 %
1000 SOLUTION, IRRIGATION IRRIGATION
Refills: 0 | Status: DISCONTINUED | OUTPATIENT
Start: 2024-09-19 | End: 2024-09-19

## 2024-09-19 RX ORDER — PANTOPRAZOLE SODIUM 40 MG/1
40 TABLET, DELAYED RELEASE ORAL EVERY 24 HOURS
Refills: 0 | Status: DISCONTINUED | OUTPATIENT
Start: 2024-09-19 | End: 2024-09-19

## 2024-09-19 RX ORDER — SODIUM CHLORIDE IRRIG SOLUTION 0.9 %
1000 SOLUTION, IRRIGATION IRRIGATION
Refills: 0 | Status: DISCONTINUED | OUTPATIENT
Start: 2024-09-19 | End: 2024-09-20

## 2024-09-19 RX ORDER — SODIUM CHLORIDE 0.9 % (FLUSH) 0.9 %
1000 SYRINGE (ML) INJECTION ONCE
Refills: 0 | Status: COMPLETED | OUTPATIENT
Start: 2024-09-19 | End: 2024-09-19

## 2024-09-19 RX ORDER — PROPOFOL 10 MG/ML
10 INJECTION, EMULSION INTRAVENOUS
Qty: 1000 | Refills: 0 | Status: DISCONTINUED | OUTPATIENT
Start: 2024-09-19 | End: 2024-09-19

## 2024-09-19 RX ORDER — ACETAMINOPHEN 325 MG
1000 TABLET ORAL ONCE
Refills: 0 | Status: COMPLETED | OUTPATIENT
Start: 2024-09-19 | End: 2024-09-19

## 2024-09-19 RX ORDER — CHLORHEXIDINE GLUCONATE ORAL RINSE 1.2 MG/ML
15 SOLUTION DENTAL EVERY 12 HOURS
Refills: 0 | Status: DISCONTINUED | OUTPATIENT
Start: 2024-09-19 | End: 2024-09-19

## 2024-09-19 RX ORDER — ONDANSETRON HCL/PF 4 MG/2 ML
4 VIAL (ML) INJECTION ONCE
Refills: 0 | Status: COMPLETED | OUTPATIENT
Start: 2024-09-19 | End: 2024-09-19

## 2024-09-19 RX ORDER — MAGNESIUM SULFATE 500 MG/ML
1 VIAL (ML) INJECTION ONCE
Refills: 0 | Status: COMPLETED | OUTPATIENT
Start: 2024-09-19 | End: 2024-09-19

## 2024-09-19 RX ORDER — CEFTRIAXONE SODIUM 1 G
1000 VIAL (EA) INJECTION ONCE
Refills: 0 | Status: COMPLETED | OUTPATIENT
Start: 2024-09-19 | End: 2024-09-19

## 2024-09-19 RX ORDER — PIPERACILLIN SODIUM AND TAZOBACTAM SODIUM 12; 1.5 G/60ML; G/60ML
3.38 INJECTION, POWDER, LYOPHILIZED, FOR SOLUTION INTRAVENOUS EVERY 8 HOURS
Refills: 0 | Status: COMPLETED | OUTPATIENT
Start: 2024-09-19 | End: 2024-09-26

## 2024-09-19 RX ORDER — INFLUENZA VIRUS VACCINE 15; 15; 15; 15 UG/.5ML; UG/.5ML; UG/.5ML; UG/.5ML
0.5 SUSPENSION INTRAMUSCULAR ONCE
Refills: 0 | Status: DISCONTINUED | OUTPATIENT
Start: 2024-09-19 | End: 2024-10-01

## 2024-09-19 RX ORDER — HYDROMORPHONE HYDROCHLORIDE 1 MG/ML
1 INJECTION, SOLUTION INTRAMUSCULAR; INTRAVENOUS; SUBCUTANEOUS EVERY 4 HOURS
Refills: 0 | Status: DISCONTINUED | OUTPATIENT
Start: 2024-09-19 | End: 2024-09-24

## 2024-09-19 RX ADMIN — PANTOPRAZOLE SODIUM 40 MILLIGRAM(S): 40 TABLET, DELAYED RELEASE ORAL at 12:08

## 2024-09-19 RX ADMIN — Medication 1000 MILLIGRAM(S): at 20:06

## 2024-09-19 RX ADMIN — HYDROMORPHONE HYDROCHLORIDE 1 MILLIGRAM(S): 1 INJECTION, SOLUTION INTRAMUSCULAR; INTRAVENOUS; SUBCUTANEOUS at 13:00

## 2024-09-19 RX ADMIN — PIPERACILLIN SODIUM AND TAZOBACTAM SODIUM 25 GRAM(S): 12; 1.5 INJECTION, POWDER, LYOPHILIZED, FOR SOLUTION INTRAVENOUS at 21:18

## 2024-09-19 RX ADMIN — Medication 4 MILLIGRAM(S): at 19:35

## 2024-09-19 RX ADMIN — Medication 100 MILLILITER(S): at 18:34

## 2024-09-19 RX ADMIN — HYDROMORPHONE HYDROCHLORIDE 1 MILLIGRAM(S): 1 INJECTION, SOLUTION INTRAMUSCULAR; INTRAVENOUS; SUBCUTANEOUS at 19:30

## 2024-09-19 RX ADMIN — CHLORHEXIDINE GLUCONATE ORAL RINSE 1 APPLICATION(S): 1.2 SOLUTION DENTAL at 08:43

## 2024-09-19 RX ADMIN — Medication 4 MILLIGRAM(S): at 09:14

## 2024-09-19 RX ADMIN — PIPERACILLIN SODIUM AND TAZOBACTAM SODIUM 200 GRAM(S): 12; 1.5 INJECTION, POWDER, LYOPHILIZED, FOR SOLUTION INTRAVENOUS at 03:56

## 2024-09-19 RX ADMIN — Medication 100 MILLIGRAM(S): at 03:14

## 2024-09-19 RX ADMIN — PROPOFOL 4.88 MICROGRAM(S)/KG/MIN: 10 INJECTION, EMULSION INTRAVENOUS at 08:45

## 2024-09-19 RX ADMIN — Medication 400 MILLIGRAM(S): at 19:35

## 2024-09-19 RX ADMIN — Medication 1000 MILLILITER(S): at 03:20

## 2024-09-19 RX ADMIN — Medication 1000 MILLILITER(S): at 04:14

## 2024-09-19 RX ADMIN — Medication 100 MICROGRAM(S): at 08:56

## 2024-09-19 RX ADMIN — Medication 5000 UNIT(S): at 14:26

## 2024-09-19 RX ADMIN — HYDROMORPHONE HYDROCHLORIDE 1 MILLIGRAM(S): 1 INJECTION, SOLUTION INTRAMUSCULAR; INTRAVENOUS; SUBCUTANEOUS at 23:17

## 2024-09-19 RX ADMIN — PIPERACILLIN SODIUM AND TAZOBACTAM SODIUM 25 GRAM(S): 12; 1.5 INJECTION, POWDER, LYOPHILIZED, FOR SOLUTION INTRAVENOUS at 09:02

## 2024-09-19 RX ADMIN — HYDROMORPHONE HYDROCHLORIDE 1 MILLIGRAM(S): 1 INJECTION, SOLUTION INTRAMUSCULAR; INTRAVENOUS; SUBCUTANEOUS at 12:08

## 2024-09-19 RX ADMIN — Medication 100 GRAM(S): at 12:13

## 2024-09-19 RX ADMIN — PIPERACILLIN SODIUM AND TAZOBACTAM SODIUM 25 GRAM(S): 12; 1.5 INJECTION, POWDER, LYOPHILIZED, FOR SOLUTION INTRAVENOUS at 14:26

## 2024-09-19 RX ADMIN — Medication 400 MILLIGRAM(S): at 15:17

## 2024-09-19 RX ADMIN — Medication 1000 MILLIGRAM(S): at 16:03

## 2024-09-19 RX ADMIN — Medication 100 MICROGRAM(S): at 09:20

## 2024-09-19 RX ADMIN — IOHEXOL 30 MILLILITER(S): 350 INJECTION, SOLUTION INTRAVENOUS at 00:53

## 2024-09-19 RX ADMIN — Medication 100 MILLILITER(S): at 08:44

## 2024-09-19 RX ADMIN — Medication 5000 UNIT(S): at 21:18

## 2024-09-19 RX ADMIN — HYDROMORPHONE HYDROCHLORIDE 1 MILLIGRAM(S): 1 INJECTION, SOLUTION INTRAMUSCULAR; INTRAVENOUS; SUBCUTANEOUS at 18:51

## 2024-09-19 NOTE — H&P ADULT - HISTORY OF PRESENT ILLNESS
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 Had vomiting 2 days ago. Last BM was loose just before coming to the ER. Last known flatus was 2 days ago. Pt never had a colonoscopy before. No history of abnormal BMs. Pt has been voiding dark urine. Patient denies fever, chills, constipation, diarrhea, melena, hematochezia, dysuria, hematuria, chest pain, shortness of breath, dizziness, cough. Patient denies prior incident.

## 2024-09-19 NOTE — CONSULT NOTE ADULT - SUBJECTIVE AND OBJECTIVE BOX
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 Had vomiting 2 days ago. Last BM was loose just before coming to the ER. Last known flatus was 2 days ago. Pt never had a colonoscopy before. No history of abnormal BMs. Pt has been voiding dark urine. Patient denies fever, chills, constipation, diarrhea, melena, hematochezia, dysuria, hematuria, chest pain, shortness of breath, dizziness, cough. Patient denies prior incident.  (19 Sep 2024 04:23)    Allergies: No Known Allergies    MEDICATIONS  NEURO  Meds: propofol Infusion 10 MICROgram(s)/kG/Min (4.88 mL/Hr) IV Continuous <Continuous>    RESPIRATORY  ABG - ( 18 Sep 2024 23:30 )  pH: x     /  pCO2: x     /  pO2: x     / HCO3: x     / Base Excess: x     /  SaO2: x       Lactate: 1.5        Meds:   CARDIOVASCULAR  Meds:   GI/NUTRITION  Meds:   GENITOURINARY  Meds: lactated ringers. 1000 milliLiter(s) IV Continuous <Continuous>    HEMATOLOGIC  Meds: heparin   Injectable 5000 Unit(s) SubCutaneous every 8 hours    [x] VTE Prophylaxis  INFECTIOUS DISEASES  Meds: piperacillin/tazobactam IVPB.. 3.375 Gram(s) IV Intermittent every 8 hours    ENDOCRINE  CAPILLARY BLOOD GLUCOSE      Meds:   OTHER MEDICATIONS:  chlorhexidine 0.12% Liquid 15 milliLiter(s) Oral Mucosa every 12 hours  chlorhexidine 2% Cloths 1 Application(s) Topical <User Schedule>      Drug Dosing Weight  Height (cm): 177.8 (01 Apr 2024 06:30)  Weight (kg): 81.4 (18 Sep 2024 21:04)  BMI (kg/m2): 25.7 (18 Sep 2024 21:04)  BSA (m2): 1.99 (18 Sep 2024 21:04)    PAST MEDICAL & SURGICAL HISTORY:  Anxiety  Tobacco dependence with current use  R epididymoorchitis   Former ETOH       FAMILY HISTORY:  FH: colon cancer (Grandparent)  FH: stomach cancer (Grandparent)      SOCIAL HISTORY:  - Unable to Assess intubated and sedated per chart review 1 pk per day smoker, previous ETOH abuse sober for 11 yrs     ADVANCE DIRECTIVES: Presumed full code     REVIEW OF SYSTEMS: Unable to Assess intubated and sedated     ICU Vital Signs Last 24 Hrs  T(C): 36.8 (19 Sep 2024 04:54), Max: 37.6 (18 Sep 2024 21:04)  T(F): 98.3 (19 Sep 2024 04:54), Max: 99.7 (18 Sep 2024 21:04)  HR: 92 (19 Sep 2024 04:54) (92 - 113)  BP: 155/96 (19 Sep 2024 04:54) (114/82 - 155/96)  BP(mean): --  ABP: --  ABP(mean): --  RR: 19 (19 Sep 2024 04:54) (16 - 19)  SpO2: 96% (19 Sep 2024 04:54) (96% - 97%)    O2 Parameters below as of 19 Sep 2024 03:35  Patient On (Oxygen Delivery Method): room air    PHYSICAL EXAM:  General: No acute distress.  Intubated and Sedated.  HEENT: Pupils equal and symmetrically reactive to light. NGT   PULM: Clear to auscultation bilaterally.  CVS: Regular rate and rhythm, no murmurs, rubs, or gallops.  ABD: Midline abd incision CDI, ESAU x1 RLQ  EXT: No edema. Agosto in place.   SKIN: Warm and well perfused, no rashes.    LABS:  CBC Full  -  ( 18 Sep 2024 23:30 )  WBC Count : 16.05 K/uL  RBC Count : 5.38 M/uL  Hemoglobin : 16.8 g/dL  Hematocrit : 50.4 %  Platelet Count - Automated : 354 K/uL  Mean Cell Volume : 93.7 fl  Mean Cell Hemoglobin : 31.2 pg  Mean Cell Hemoglobin Concentration : 33.3 g/dL  Auto Neutrophil # : 13.36 K/uL  Auto Lymphocyte # : 1.87 K/uL  Auto Monocyte # : 0.69 K/uL  Auto Eosinophil # : 0.04 K/uL  Auto Basophil # : 0.04 K/uL  Auto Neutrophil % : 83.3 %  Auto Lymphocyte % : 11.7 %  Auto Monocyte % : 4.3 %  Auto Eosinophil % : 0.2 %  Auto Basophil % : 0.2 %    09-18    138  |  101  |  22  ----------------------------<  131[H]  3.7   |  27  |  1.14    Ca    9.5      18 Sep 2024 23:30    TPro  7.6  /  Alb  3.8  /  TBili  1.0  /  DBili  x   /  AST  15  /  ALT  27  /  AlkPhos  119  09-18    CAPILLARY BLOOD GLUCOSE      PT/INR - ( 19 Sep 2024 03:00 )   PT: 11.8 sec;   INR: 0.99 ratio      PTT - ( 19 Sep 2024 03:00 )  PTT:27.4 sec  Urinalysis Basic - ( 18 Sep 2024 23:40 )    Color: Orange / Appearance: Clear / SG: >1.030 / pH: x  Gluc: x / Ketone: 40 mg/dL  / Bili: Moderate / Urobili: 1.0 mg/dL   Blood: x / Protein: 30 mg/dL / Nitrite: Positive   Leuk Esterase: Small / RBC: 3 /HPF / WBC 1 /HPF   Sq Epi: x / Non Sq Epi: x / Bacteria: Moderate /HPF    Radiology:   < from: CT Abdomen and Pelvis w/ Oral Cont and w/ IV Cont (09.19.24 @ 02:19) >  IMPRESSION:  1.  Markedly narrowed segment of sigmoid colon shows marked wall   thickening and associated mucosal hyperenhancement; it is the explicit   culprit for the large bowel obstruction. This is highly suspicious for a   primary colonic neoplasm, although, an ongoing infectious/inflammatory   process could have a similar appearance in the proper clinical scenario.  2.  There are linear soft tissue densities extending from the abnormally   narrowed sigmoid colonic segment and on the urinary bladder dome where   there is a mural peripherally enhancing hypodense collection. This   collection may represent an abscess as complication from colovesical   fistula formation. Direct extension of metastatic disease is also a   possibility.    Read above text for additional findings, and detailed explanations.    --- End of Report ---      < end of copied text >

## 2024-09-19 NOTE — ED ADULT NURSE NOTE - OBJECTIVE STATEMENT
48Y M no PMH c/o LLQ abdominal pain, N/V and testicle pain, and chills x Tuesday. pt unable to tolerate po intake, has been vomiting all day. pt reports squeezing pain and is noted hiccuping. concerned about  L testicle as he had orchitis before

## 2024-09-19 NOTE — BRIEF OPERATIVE NOTE - NSICDXBRIEFPROCEDURE_GEN_ALL_CORE_FT
PROCEDURES:  Exploratory laparotomy 19-Sep-2024 07:48:11  Margret Casey  Dinorah's procedure 19-Sep-2024 07:48:26  Margret Casey  Repair of colovesical fistula 19-Sep-2024 07:48:46  Margret Casey

## 2024-09-19 NOTE — BRIEF OPERATIVE NOTE - NSICDXBRIEFPREOP_GEN_ALL_CORE_FT
PRE-OP DIAGNOSIS:  Large bowel obstruction 19-Sep-2024 07:55:48  Margret Casey  Colovesical fistula 19-Sep-2024 07:57:40  Margret Casey

## 2024-09-19 NOTE — H&P ADULT - NSHPPHYSICALEXAM_GEN_ALL_CORE
PHYSICAL EXAM:  CONSTITUTIONAL: NAD, well-developed  HEAD:  Atraumatic, Normocephalic  EYES: Conjunctiva and sclera clear  ENMT: No tonsillar erythema, exudates, or enlargement; Moist mucous membranes, No lesions  NECK: Supple, No JVD, Normal thyroid  NERVOUS SYSTEM:  Alert & Oriented X3  RESPIRATORY: Clear to auscultation bilaterally; No rales, rhonchi, wheezing  CARDIOVASCULAR: Regular rate and rhythm. S1S2  GASTROINTESTINAL: softly distended, hypoactive BS, soft, nontender, no guarding, no rigidity   MUSCULOSKELETAL:  2+ Peripheral Pulses, No clubbing, cyanosis, or edema

## 2024-09-19 NOTE — H&P ADULT - NSHPREVIEWOFSYSTEMS_GEN_ALL_CORE
REVIEW OF SYSTEMS:  CONSTITUTIONAL: No fever, weight loss, or fatigue  EYES: No eye pain, visual disturbances, discharge  ENMT:  No difficulty hearing, tinnitus, vertigo; No sinus or throat pain  NECK: No pain or stiffness  BREASTS: No pain, masses, or nipple discharge  RESPIRATORY: No cough, wheezing, chills or hemoptysis; No shortness of breath  CARDIOVASCULAR: No chest pain, palpitations, dizziness, or leg swelling  GASTROINTESTINAL: +lower abdominal pain, nausea, vomiting. No hematemesis; No diarrhea or constipation. No melena or hematochezia.  GENITOURINARY: No dysuria, frequency, hematuria, or incontinence  NEUROLOGICAL: No headaches, memory loss, loss of strength, numbness, or tremors  SKIN: No itching, burning, rashes, or lesions   LYMPH NODES: No enlarged glands  ENDOCRINE: No heat or cold intolerance; No hair loss  MUSCULOSKELETAL: No joint pain or swelling; No muscle, back, or extremity pain  PSYCHIATRIC: No depression, anxiety, mood swings, or difficulty sleeping  HEME/LYMPH: No easy bruising, or bleeding gums  ALLERY AND IMMUNOLOGIC: No hives or eczema

## 2024-09-19 NOTE — ED ADULT NURSE NOTE - ED STAT RN HANDOFF DETAILS
report given to HAROON Roblero. pt a&ox4, in stretcher with low suction NG tube running. Agosto placed. 2 IVs patent and intact.

## 2024-09-19 NOTE — H&P ADULT - NSHPLABSRESULTS_GEN_ALL_CORE
16.8   16.05 )-----------( 354      ( 18 Sep 2024 23:30 )             50.4   09-18    138  |  101  |  22  ----------------------------<  131[H]  3.7   |  27  |  1.14    Ca    9.5      18 Sep 2024 23:30    TPro  7.6  /  Alb  3.8  /  TBili  1.0  /  DBili  x   /  AST  15  /  ALT  27  /  AlkPhos  119  09-18    < from: CT Abdomen and Pelvis w/ Oral Cont and w/ IV Cont (09.19.24 @ 02:19) >    FINDINGS:  LOWER CHEST: Mild dependent atelectasis. Calcified atherosclerosis of the   coronary arteries. No pleural/pericardial effusions. Small hiatal hernia.   Oral contrast within the mildly dilated distal esophagus may be due to   reflux or some degree of obstruction. Large hiatal hernia contains most   of the gastric cardia and part of the gastric fundus.    LIVER: Within normal limits.  BILE DUCTS: Normal caliber.  GALLBLADDER: Within normal limits.  SPLEEN: Within normal limits.  PANCREAS: Withinnormal limits.  ADRENALS: Within normal limits.  KIDNEYS/URETERS: Within normal limits.    BLADDER: Read below.  REPRODUCTIVE ORGANS: Prostate within normal limits.    BOWEL: No bowel obstruction. Appendix is normal. There is a 7.2 cm   markedly narrowed segment of [id number] sigmoid colon demonstrating   marked wall thickening and moderate mucosal enhancement. Proximal   segments of colon are markedly dilated. Linear soft tissue densities   extending from the most caudal serosal margin of this segment and on 2   the dome of the urinary bladder where there is a peripherally enhancing,   centrally hypodense, 3.7 cm x 1.9 cm x 5.2 cm urinary bladder mural   lesion.  PERITONEUM/RETROPERITONEUM: Within normal limits.  VESSELS: Atherosclerotic changes.  LYMPH NODES: No lymphadenopathy.  ABDOMINAL WALL: Tiny fat-containing umbilical hernia.  BONES: Degenerative changes.    IMPRESSION:  1.  Markedly narrowed segment of sigmoid colon shows marked wall   thickening and associated mucosal hyperenhancement; it is the explicit   culprit for the large bowel obstruction. This is highly suspicious for a   primary colonic neoplasm, although, an ongoing infectious/inflammatory   process could have a similar appearance in the proper clinical scenario.  2.  There are linear soft tissue densities extending from the abnormally   narrowed sigmoid colonic segment and on the urinary bladder dome where   there is a mural peripherally enhancing hypodense collection. This   collection may represent an abscess as complication from colovesical   fistula formation. Direct extension of metastatic disease is also a   possibility.    Read above text for additional findings, and detailed explanations.    < end of copied text >

## 2024-09-19 NOTE — H&P ADULT - NSICDXFAMILYHX_GEN_ALL_CORE_FT
FAMILY HISTORY:  Grandparent  Still living? Unknown  FH: colon cancer, Age at diagnosis: Age Unknown  FH: stomach cancer, Age at diagnosis: Age Unknown

## 2024-09-19 NOTE — CONSULT NOTE ADULT - CRITICAL CARE ATTENDING COMMENT
Gong: I have seen and examined the patient face to face, have reviewed and addended the HPI, PE and a/p as necessary.    Assessment: 47 y/o male PMHx anixety, right epididymoorchitis in April, current smoker, former ETOH presented to ER with lower abdominal pain; CT showing LBO. s/p OR with ex-lap, Hartmanns for repair of colovesical fistula. Admitted to ICU post op.     Gen: intubated and sedated  HEENT: Intubated with ETT  CARD -s1s2, RRR, no M,G,R;   PULM - Coarse vented breath sounds, symmetric breath sounds;   ABD -  +BS, loop colostomy in place, drains in place with serosanginuous   EXT - symmetric pulses, no edema;   NEURO - no focal neuro deficits     Neuro: Intubated and sedated; c/w propofol.   - c/w tylenol PRN, dilaudid for pain.    CV: HD stable without vasopressor requirements; MAP goal>65  Pulm: Intubated and sedated post operatively, weaned to extubation today as tolerated SBT.    - Once extubated; incentive spirometry, early mobilization   - smoking cessation, nicotine patch    GI: POD#0 for ex lap with Hartmanns, with colovesical fistula and large bowel obstruction  - follow up surg recs  - NPO, NGT to LWS  - c/w protonix  Renal/Metabolic: s/p 5L IVF intra op.  Will continue maintenance fluids.  Likely requires additional bolus.    - monitor closely for DOLORES   - s/p bladder repair in OR   - andre not to be removed.    - cont to monitor strict I/Os, voiding freely, making adequate UO  - replete lytes as appropriate   ID: c/w zosyn follow up cultures from OR.  UA noted to be positive, check urine culture.    Heme: start hep sq for dvt ppx.  trend cbc.   Endo: monitor FS q6h while NPO  Dispo: Extubated.  Will observe in ICU.

## 2024-09-19 NOTE — ED ADULT NURSE NOTE - NSFALLUNIVINTERV_ED_ALL_ED
Bed/Stretcher in lowest position, wheels locked, appropriate side rails in place/Call bell, personal items and telephone in reach/Instruct patient to call for assistance before getting out of bed/chair/stretcher/Non-slip footwear applied when patient is off stretcher/[place] to call system/Physically safe environment - no spills, clutter or unnecessary equipment/Purposeful proactive rounding/Room/bathroom lighting operational, light cord in reach

## 2024-09-19 NOTE — BRIEF OPERATIVE NOTE - NSICDXBRIEFPOSTOP_GEN_ALL_CORE_FT
POST-OP DIAGNOSIS:  Large bowel obstruction 19-Sep-2024 07:58:16  Margret Casey  Colovesical fistula 19-Sep-2024 07:58:26  Margret Casey

## 2024-09-19 NOTE — AIRWAY REMOVAL NOTE  ADULT & PEDS - ARTIFICAL AIRWAY REMOVAL COMMENTS
Pt extubated to room air without incident. BS heard bilaterally, no stridor or respiratory distress noted. Will continue to monitor pt status.

## 2024-09-19 NOTE — PATIENT PROFILE ADULT - FALL HARM RISK - HARM RISK INTERVENTIONS

## 2024-09-19 NOTE — H&P ADULT - NS ATTEND AMEND GEN_ALL_CORE FT
48M with large bowel obestruction and colovesical fistula  Abdomen distended and tender    Severe sepsis with tachycardia, elevation in WBC, and oliguric DOLORES  Urine is cola colored    NPO, IVF  IV abx  Risks, benefits alternatives to exploratory laparotomy, bowel resection, ostomy creation explained and consent obtained    75 mintues spent with patient and coordinating care

## 2024-09-19 NOTE — H&P ADULT - PROBLEM SELECTOR PLAN 1
-Admit patient   -Emergent OR booked  -Pre-op labs, NPO, NGT to LWS, IV hydration  -Xray to confirm NGT placement  -Antibiotics: Zosyn  -Agosto  -GI ppx, DVT ppx  -2 units of PRBC on hold for the OR  -CXR, EKG  -ICU notified about patient  -Consent in chart  -Discussed with Dr. Dsouza

## 2024-09-19 NOTE — CONSULT NOTE ADULT - ASSESSMENT
Assessment: 49 y/o male PMHx anixety, right epididymoorchitis in April, current smoker, former ETOH presented to ER with lower abdominal pain; CT showing LBO. s/p OR with ex-lap, Hartmanns for repair of colovesical fistula. Admitted to ICU post op.       # Neuro:  - A/Ox3 @ baseline, currently intubated and sedated  - sedation: propofol for sedation will attempt SAT   - pain control regimen post op   - ATC Tylenol, PRN Dilaudid     #Resp:  - left intubated post op due to COPD/smoking, taking good volumes on vent   - will perform SBT, wean to extubate as not plan for RTOR  - Vent bundle, maintain sats>92%   - Once extubated; incentive spirometry, early mobilization   - smoking cessation, nicotine patch      #CV:  - HD stable without vasopressor requirements  - MAP goal>65    #GI:  - CT abd/pelvis with LBO s/p POD #0 for ex lap Hartmanns with repair of colovesical fistula; appreciate surgery recs   - Diet: NPO   - GI ppx: Protonix   - NGT LCWS    #Renal:  - fluids/IVL: 5L IVF intraop, will continue IVF  - monitor closely for DOLORES   - s/p bladder repair in OR   - andre, cont to monitor strict I/Os  - voiding freely, making adequate UO  - replete lytes as appropriate     #ID:  - afebrile, wbc 16k s/p OR for LBO   - UA+; f/u culture data, lactate, & procal level   - zosyn per surgery     #Heme:  //DVT ppx  - cbc stable, active t&s  -SCDs/chemoppx: heparin subq     #Endo:  - cont to monitor FS6hr while NPO     #Dispo:   - ICU. Will wean to extubate. Family to be updated. Discussed with ICU attending Adalberto Assessment: 47 y/o male PMHx anixety, right epididymoorchitis in April, current smoker, former ETOH presented to ER with lower abdominal pain; CT showing LBO. s/p OR with ex-lap, Hartmanns for repair of colovesical fistula. Admitted to ICU post op.       # Neuro:  - A/Ox3 @ baseline, currently intubated and sedated  - sedation: propofol for sedation will attempt SAT   - pain control regimen post op   - ATC Tylenol, PRN Dilaudid     #Resp:  - left intubated post op due to COPD/smoking, taking good volumes on vent   - will perform SBT, wean to extubate as not plan for RTOR  - Vent bundle, maintain sats>92%   - Once extubated; incentive spirometry, early mobilization   - smoking cessation, nicotine patch      #CV:  - HD stable without vasopressor requirements  - MAP goal>65    #GI:  - CT abd/pelvis with LBO s/p POD #0 for ex lap Hartmanns with repair of colovesical fistula; appreciate surgery recs   - Diet: NPO   - GI ppx: Protonix   - NGT LCWS    #Renal:  - fluids/IVL: 5L IVF intraop, will continue IVF  - monitor closely for DOLORES   - s/p bladder repair in OR   - andre, cont to monitor strict I/Os  - voiding freely, making adequate UO  - replete lytes as appropriate     #ID:  - afebrile, wbc 16k s/p OR for LBO   - UA+; f/u culture data, lactate, & procal level   - zosyn per surgery     #Heme:  //DVT ppx  - cbc stable, active t&s  -SCDs/chemoppx: heparin subq     #Endo:  - cont to monitor FS6hr while NPO     #Dispo:   - ICU. Will wean to extubate. Family to be updated. Discussed with ICU attending Adalberto

## 2024-09-19 NOTE — H&P ADULT - ASSESSMENT
49 y/o male PMHx anixety, right epididymoorchitis in April, current smoker, former ETOH (sober 11 years) presents with lower abdominal pain for a few days.  CT: Markedly narrowed segment of sigmoid colon shows marked wall thickening and associated mucosal hyperenhancement; it is the explicit culprit for the large bowel obstruction, abscess as complication from colovesical fistula formation.

## 2024-09-19 NOTE — CONSULT NOTE ADULT - PROVIDER SPECIALTY LIST ADULT
Critical Care Patient is calling in regards to if she can stop her BRILINTA prior to her having cataract surgery.     Patient is also states she has been feeling tired lately and would like to know if it is because of her medication.     Please call patient back at   Telephone Information:   Mobile [phone]

## 2024-09-20 LAB
ALBUMIN SERPL ELPH-MCNC: 2.2 G/DL — LOW (ref 3.3–5)
ALP SERPL-CCNC: 69 U/L — SIGNIFICANT CHANGE UP (ref 40–120)
ALT FLD-CCNC: 20 U/L — SIGNIFICANT CHANGE UP (ref 12–78)
ANION GAP SERPL CALC-SCNC: 6 MMOL/L — SIGNIFICANT CHANGE UP (ref 5–17)
AST SERPL-CCNC: 51 U/L — HIGH (ref 15–37)
BASE EXCESS BLDA CALC-SCNC: 8 MMOL/L — HIGH (ref -2–3)
BASOPHILS # BLD AUTO: 0 K/UL — SIGNIFICANT CHANGE UP (ref 0–0.2)
BASOPHILS NFR BLD AUTO: 0 % — SIGNIFICANT CHANGE UP (ref 0–2)
BILIRUB SERPL-MCNC: 0.7 MG/DL — SIGNIFICANT CHANGE UP (ref 0.2–1.2)
BLOOD GAS COMMENTS ARTERIAL: SIGNIFICANT CHANGE UP
BUN SERPL-MCNC: 11 MG/DL — SIGNIFICANT CHANGE UP (ref 7–23)
C TRACH RRNA SPEC QL NAA+PROBE: SIGNIFICANT CHANGE UP
CALCIUM SERPL-MCNC: 8 MG/DL — LOW (ref 8.5–10.1)
CHLORIDE SERPL-SCNC: 105 MMOL/L — SIGNIFICANT CHANGE UP (ref 96–108)
CO2 BLDA-SCNC: 34 MMOL/L — HIGH (ref 19–24)
CO2 SERPL-SCNC: 25 MMOL/L — SIGNIFICANT CHANGE UP (ref 22–31)
CREAT SERPL-MCNC: 0.81 MG/DL — SIGNIFICANT CHANGE UP (ref 0.5–1.3)
CULTURE RESULTS: SIGNIFICANT CHANGE UP
EGFR: 107 ML/MIN/1.73M2 — SIGNIFICANT CHANGE UP
EOSINOPHIL # BLD AUTO: 0.74 K/UL — HIGH (ref 0–0.5)
EOSINOPHIL NFR BLD AUTO: 5 % — SIGNIFICANT CHANGE UP (ref 0–6)
GAS PNL BLDA: SIGNIFICANT CHANGE UP
GLUCOSE BLDC GLUCOMTR-MCNC: 101 MG/DL — HIGH (ref 70–99)
GLUCOSE SERPL-MCNC: 90 MG/DL — SIGNIFICANT CHANGE UP (ref 70–99)
GRAM STN FLD: SIGNIFICANT CHANGE UP
HCO3 BLDA-SCNC: 33 MMOL/L — HIGH (ref 21–28)
HCT VFR BLD CALC: 37.4 % — LOW (ref 39–50)
HGB BLD-MCNC: 12.4 G/DL — LOW (ref 13–17)
HOROWITZ INDEX BLDA+IHG-RTO: 0.44 — SIGNIFICANT CHANGE UP
LYMPHOCYTES # BLD AUTO: 14 % — SIGNIFICANT CHANGE UP (ref 13–44)
LYMPHOCYTES # BLD AUTO: 2.06 K/UL — SIGNIFICANT CHANGE UP (ref 1–3.3)
MAGNESIUM SERPL-MCNC: 1.8 MG/DL — SIGNIFICANT CHANGE UP (ref 1.6–2.6)
MANUAL SMEAR VERIFICATION: SIGNIFICANT CHANGE UP
MCHC RBC-ENTMCNC: 31.2 PG — SIGNIFICANT CHANGE UP (ref 27–34)
MCHC RBC-ENTMCNC: 33.2 G/DL — SIGNIFICANT CHANGE UP (ref 32–36)
MCV RBC AUTO: 94.2 FL — SIGNIFICANT CHANGE UP (ref 80–100)
MONOCYTES # BLD AUTO: 1.18 K/UL — HIGH (ref 0–0.9)
MONOCYTES NFR BLD AUTO: 8 % — SIGNIFICANT CHANGE UP (ref 2–14)
N GONORRHOEA RRNA SPEC QL NAA+PROBE: SIGNIFICANT CHANGE UP
NEUTROPHILS # BLD AUTO: 10.75 K/UL — HIGH (ref 1.8–7.4)
NEUTROPHILS NFR BLD AUTO: 73 % — SIGNIFICANT CHANGE UP (ref 43–77)
NRBC # BLD: 0 /100 WBCS — SIGNIFICANT CHANGE UP (ref 0–0)
NRBC # BLD: SIGNIFICANT CHANGE UP /100 WBCS (ref 0–0)
PCO2 BLDA: 45 MMHG — SIGNIFICANT CHANGE UP (ref 32–46)
PH BLDA: 7.47 — HIGH (ref 7.35–7.45)
PHOSPHATE SERPL-MCNC: 2 MG/DL — LOW (ref 2.5–4.5)
PLAT MORPH BLD: NORMAL — SIGNIFICANT CHANGE UP
PLATELET # BLD AUTO: 252 K/UL — SIGNIFICANT CHANGE UP (ref 150–400)
PO2 BLDA: 81 MMHG — LOW (ref 83–108)
POTASSIUM SERPL-MCNC: 4.2 MMOL/L — SIGNIFICANT CHANGE UP (ref 3.5–5.3)
POTASSIUM SERPL-SCNC: 4.2 MMOL/L — SIGNIFICANT CHANGE UP (ref 3.5–5.3)
PROT SERPL-MCNC: 4.9 GM/DL — LOW (ref 6–8.3)
RBC # BLD: 3.97 M/UL — LOW (ref 4.2–5.8)
RBC # FLD: 13.2 % — SIGNIFICANT CHANGE UP (ref 10.3–14.5)
RBC BLD AUTO: NORMAL — SIGNIFICANT CHANGE UP
SAO2 % BLDA: 97.6 % — SIGNIFICANT CHANGE UP (ref 94–98)
SODIUM SERPL-SCNC: 136 MMOL/L — SIGNIFICANT CHANGE UP (ref 135–145)
SPECIMEN SOURCE: SIGNIFICANT CHANGE UP
WBC # BLD: 14.73 K/UL — HIGH (ref 3.8–10.5)
WBC # FLD AUTO: 14.73 K/UL — HIGH (ref 3.8–10.5)

## 2024-09-20 PROCEDURE — 71045 X-RAY EXAM CHEST 1 VIEW: CPT | Mod: 26

## 2024-09-20 PROCEDURE — 99233 SBSQ HOSP IP/OBS HIGH 50: CPT

## 2024-09-20 RX ORDER — IPRATROPIUM BROMIDE AND ALBUTEROL SULFATE .5; 3 MG/3ML; MG/3ML
3 SOLUTION RESPIRATORY (INHALATION) EVERY 6 HOURS
Refills: 0 | Status: DISCONTINUED | OUTPATIENT
Start: 2024-09-20 | End: 2024-10-01

## 2024-09-20 RX ORDER — ONDANSETRON HCL/PF 4 MG/2 ML
4 VIAL (ML) INJECTION EVERY 6 HOURS
Refills: 0 | Status: DISCONTINUED | OUTPATIENT
Start: 2024-09-20 | End: 2024-10-01

## 2024-09-20 RX ORDER — BENZOCAINE AND LEVOMENTHOL 200; 5 MG/G; MG/G
1 SPRAY TOPICAL
Refills: 0 | Status: DISCONTINUED | OUTPATIENT
Start: 2024-09-20 | End: 2024-10-01

## 2024-09-20 RX ORDER — SODIUM PHOSPHATE IN D5W 15MMOL/250
15 PLASTIC BAG, INJECTION (ML) INTRAVENOUS ONCE
Refills: 0 | Status: COMPLETED | OUTPATIENT
Start: 2024-09-20 | End: 2024-09-20

## 2024-09-20 RX ORDER — IPRATROPIUM BROMIDE AND ALBUTEROL SULFATE .5; 3 MG/3ML; MG/3ML
3 SOLUTION RESPIRATORY (INHALATION) ONCE
Refills: 0 | Status: COMPLETED | OUTPATIENT
Start: 2024-09-20 | End: 2024-09-20

## 2024-09-20 RX ORDER — ACETAMINOPHEN 325 MG
1000 TABLET ORAL EVERY 8 HOURS
Refills: 0 | Status: COMPLETED | OUTPATIENT
Start: 2024-09-20 | End: 2024-09-21

## 2024-09-20 RX ORDER — MAGNESIUM SULFATE 500 MG/ML
2 VIAL (ML) INJECTION ONCE
Refills: 0 | Status: COMPLETED | OUTPATIENT
Start: 2024-09-20 | End: 2024-09-20

## 2024-09-20 RX ADMIN — Medication 5000 UNIT(S): at 13:05

## 2024-09-20 RX ADMIN — HYDROMORPHONE HYDROCHLORIDE 0.5 MILLIGRAM(S): 1 INJECTION, SOLUTION INTRAMUSCULAR; INTRAVENOUS; SUBCUTANEOUS at 19:30

## 2024-09-20 RX ADMIN — HYDROMORPHONE HYDROCHLORIDE 1 MILLIGRAM(S): 1 INJECTION, SOLUTION INTRAMUSCULAR; INTRAVENOUS; SUBCUTANEOUS at 06:00

## 2024-09-20 RX ADMIN — Medication 1 SPRAY(S): at 04:16

## 2024-09-20 RX ADMIN — Medication 100 MILLILITER(S): at 05:36

## 2024-09-20 RX ADMIN — Medication 25 GRAM(S): at 05:27

## 2024-09-20 RX ADMIN — Medication 1000 MILLIGRAM(S): at 22:00

## 2024-09-20 RX ADMIN — PIPERACILLIN SODIUM AND TAZOBACTAM SODIUM 25 GRAM(S): 12; 1.5 INJECTION, POWDER, LYOPHILIZED, FOR SOLUTION INTRAVENOUS at 13:06

## 2024-09-20 RX ADMIN — HYDROMORPHONE HYDROCHLORIDE 0.5 MILLIGRAM(S): 1 INJECTION, SOLUTION INTRAMUSCULAR; INTRAVENOUS; SUBCUTANEOUS at 18:36

## 2024-09-20 RX ADMIN — PANTOPRAZOLE SODIUM 40 MILLIGRAM(S): 40 TABLET, DELAYED RELEASE ORAL at 11:42

## 2024-09-20 RX ADMIN — Medication 100 MILLILITER(S): at 13:10

## 2024-09-20 RX ADMIN — Medication 400 MILLIGRAM(S): at 13:07

## 2024-09-20 RX ADMIN — IPRATROPIUM BROMIDE AND ALBUTEROL SULFATE 3 MILLILITER(S): .5; 3 SOLUTION RESPIRATORY (INHALATION) at 23:18

## 2024-09-20 RX ADMIN — Medication 400 MILLIGRAM(S): at 21:24

## 2024-09-20 RX ADMIN — Medication 1000 MILLIGRAM(S): at 15:07

## 2024-09-20 RX ADMIN — IPRATROPIUM BROMIDE AND ALBUTEROL SULFATE 3 MILLILITER(S): .5; 3 SOLUTION RESPIRATORY (INHALATION) at 20:52

## 2024-09-20 RX ADMIN — CHLORHEXIDINE GLUCONATE ORAL RINSE 1 APPLICATION(S): 1.2 SOLUTION DENTAL at 05:54

## 2024-09-20 RX ADMIN — BENZOCAINE AND LEVOMENTHOL 1 LOZENGE: 200; 5 SPRAY TOPICAL at 12:23

## 2024-09-20 RX ADMIN — Medication 5000 UNIT(S): at 21:23

## 2024-09-20 RX ADMIN — Medication 62.5 MILLIMOLE(S): at 05:26

## 2024-09-20 RX ADMIN — PIPERACILLIN SODIUM AND TAZOBACTAM SODIUM 25 GRAM(S): 12; 1.5 INJECTION, POWDER, LYOPHILIZED, FOR SOLUTION INTRAVENOUS at 05:26

## 2024-09-20 RX ADMIN — Medication 5000 UNIT(S): at 05:27

## 2024-09-20 RX ADMIN — BENZOCAINE AND LEVOMENTHOL 1 LOZENGE: 200; 5 SPRAY TOPICAL at 19:55

## 2024-09-20 RX ADMIN — HYDROMORPHONE HYDROCHLORIDE 1 MILLIGRAM(S): 1 INJECTION, SOLUTION INTRAMUSCULAR; INTRAVENOUS; SUBCUTANEOUS at 05:36

## 2024-09-20 RX ADMIN — HYDROMORPHONE HYDROCHLORIDE 1 MILLIGRAM(S): 1 INJECTION, SOLUTION INTRAMUSCULAR; INTRAVENOUS; SUBCUTANEOUS at 00:00

## 2024-09-20 RX ADMIN — Medication 4 MILLIGRAM(S): at 11:59

## 2024-09-20 RX ADMIN — PIPERACILLIN SODIUM AND TAZOBACTAM SODIUM 25 GRAM(S): 12; 1.5 INJECTION, POWDER, LYOPHILIZED, FOR SOLUTION INTRAVENOUS at 21:23

## 2024-09-20 NOTE — PROGRESS NOTE ADULT - SUBJECTIVE AND OBJECTIVE BOX
CHIEF COMPLAINT:    Interval Events:    REVIEW OF SYSTEMS:  Constitutional: [ ] fevers [ ] chills [ ] weight loss [ ] weight gain  HEENT: [ ] dry eyes [ ] eye irritation [ ] postnasal drip [ ] nasal congestion  CV: [ ] chest pain [ ] orthopnea [ ] palpitations [ ] murmur  Resp: [ ] cough [ ] shortness of breath [ ] dyspnea [ ] wheezing [ ] sputum [ ] hemoptysis  GI: [ ] nausea [ ] vomiting [ ] diarrhea [ ] constipation [ ] abd pain [ ] dysphagia   : [ ] dysuria [ ] nocturia [ ] hematuria [ ] increased urinary frequency  Musculoskeletal: [ ] back pain [ ] myalgias [ ] arthralgias [ ] fracture  Skin: [ ] rash [ ] itch  Neurological: [ ] headache [ ] dizziness [ ] syncope [ ] weakness [ ] numbness  Hematologic/Lymphatic: [ ] anemia [ ] bleeding problem  Allergic/Immunologic: [ ] itchy eyes [ ] nasal discharge [ ] hives [ ] angioedema  [ ] All other systems negative  [ ] Unable to assess ROS because ________    OBJECTIVE:  ICU Vital Signs Last 24 Hrs  T(C): 38.1 (20 Sep 2024 05:00), Max: 38.1 (19 Sep 2024 16:00)  T(F): 100.5 (20 Sep 2024 05:00), Max: 100.6 (19 Sep 2024 16:00)  HR: 105 (20 Sep 2024 07:11) (80 - 120)  BP: 103/64 (20 Sep 2024 07:00) (96/63 - 212/115)  BP(mean): 77 (20 Sep 2024 07:00) (72 - 143)  ABP: --  ABP(mean): --  RR: 29 (20 Sep 2024 07:11) (10 - 29)  SpO2: 89% (20 Sep 2024 07:11) (85% - 100%)    O2 Parameters below as of 19 Sep 2024 19:00  Patient On (Oxygen Delivery Method): room air          Mode: AC/ CMV (Assist Control/ Continuous Mandatory Ventilation), RR (machine): 14, TV (machine): 450, FiO2: 50, PEEP: 5, ITime: 1, MAP: 9, PIP: 27    09-19 @ 07:01  -  09-20 @ 07:00  --------------------------------------------------------  IN: 2720.6 mL / OUT: 3950 mL / NET: -1229.4 mL      CAPILLARY BLOOD GLUCOSE      POCT Blood Glucose.: 104 mg/dL (19 Sep 2024 19:28)      PHYSICAL EXAM:  General:   HEENT:   Neck:   Respiratory:   Cardiovascular:   Abdomen:   Extremities:   Skin:   Neurological:  Psychiatry:    LINES:    HOSPITAL MEDICATIONS:  MEDICATIONS  (STANDING):  chlorhexidine 2% Cloths 1 Application(s) Topical <User Schedule>  heparin   Injectable 5000 Unit(s) SubCutaneous every 8 hours  influenza   Vaccine 0.5 milliLiter(s) IntraMuscular once  lactated ringers. 1000 milliLiter(s) (100 mL/Hr) IV Continuous <Continuous>  pantoprazole  Injectable 40 milliGRAM(s) IV Push daily  piperacillin/tazobactam IVPB.. 3.375 Gram(s) IV Intermittent every 8 hours    MEDICATIONS  (PRN):  HYDROmorphone  Injectable 0.5 milliGRAM(s) IV Push every 4 hours PRN Moderate Pain (4 - 6)  HYDROmorphone  Injectable 1 milliGRAM(s) IV Push every 4 hours PRN Severe Pain (7 - 10)      LABS:                        12.4   14.73 )-----------( 252      ( 20 Sep 2024 04:27 )             37.4     Hgb Trend: 12.4<--, 14.3<--, 16.8<--  09-20    136  |  105  |  11  ----------------------------<  90  4.2   |  25  |  0.81    Ca    8.0[L]      20 Sep 2024 04:27  Phos  2.0     09-20  Mg     1.8     09-20    TPro  4.9[L]  /  Alb  2.2[L]  /  TBili  0.7  /  DBili  x   /  AST  51[H]  /  ALT  20  /  AlkPhos  69  09-20    PT/INR - ( 19 Sep 2024 03:00 )   PT: 11.8 sec;   INR: 0.99 ratio         PTT - ( 19 Sep 2024 03:00 )  PTT:27.4 sec  Urinalysis Basic - ( 20 Sep 2024 04:27 )    Color: x / Appearance: x / SG: x / pH: x  Gluc: 90 mg/dL / Ketone: x  / Bili: x / Urobili: x   Blood: x / Protein: x / Nitrite: x   Leuk Esterase: x / RBC: x / WBC x   Sq Epi: x / Non Sq Epi: x / Bacteria: x      Arterial Blood Gas:  09-19 @ 08:28  7.30/49/132/24/96.8/-2.9  ABG lactate: --        MICROBIOLOGY:     RADIOLOGY:  [ ] Reviewed and interpreted by me CHIEF COMPLAINT:    Interval Events:  ostomy has output   pt is feeling well  Tmax 100.6  output from ESAU drains more serosanginous    REVIEW OF SYSTEMS:  Constitutional: [- ] fevers [ -] chills [ ] weight loss [ ] weight gain  HEENT: [ ] dry eyes [ ] eye irritation [ ] postnasal drip [ ] nasal congestion  CV: [ -] chest pain [- ] orthopnea [- ] palpitations [ ] murmur  Resp: [ -] cough [- ] shortness of breath [- ] dyspnea [- ] wheezing [- ] sputum [- ] hemoptysis  GI: [ -] nausea [- ] vomiting [- ] diarrhea [- ] constipation [- ] abd pain [ ] dysphagia   : [ ] dysuria [ ] nocturia [ ] hematuria [ ] increased urinary frequency  Musculoskeletal: [- ] back pain [- ] myalgias [- ] arthralgias [ ] fracture  Skin: [ ] rash [ ] itch  Neurological: [ -] headache [- ] dizziness [- ] syncope [- ] weakness [ ] numbness  Hematologic/Lymphatic: [ ] anemia [ ] bleeding problem  Allergic/Immunologic: [ ] itchy eyes [ ] nasal discharge [ ] hives [ ] angioedema  [x ] All other systems negative  [ ] Unable to assess ROS because ________    OBJECTIVE:  ICU Vital Signs Last 24 Hrs  T(C): 38.1 (20 Sep 2024 05:00), Max: 38.1 (19 Sep 2024 16:00)  T(F): 100.5 (20 Sep 2024 05:00), Max: 100.6 (19 Sep 2024 16:00)  HR: 105 (20 Sep 2024 07:11) (80 - 120)  BP: 103/64 (20 Sep 2024 07:00) (96/63 - 212/115)  BP(mean): 77 (20 Sep 2024 07:00) (72 - 143)  ABP: --  ABP(mean): --  RR: 29 (20 Sep 2024 07:11) (10 - 29)  SpO2: 89% (20 Sep 2024 07:11) (85% - 100%)    O2 Parameters below as of 19 Sep 2024 19:00  Patient On (Oxygen Delivery Method): room air          Mode: AC/ CMV (Assist Control/ Continuous Mandatory Ventilation), RR (machine): 14, TV (machine): 450, FiO2: 50, PEEP: 5, ITime: 1, MAP: 9, PIP: 27    09-19 @ 07:01  -  09-20 @ 07:00  --------------------------------------------------------  IN: 2720.6 mL / OUT: 3950 mL / NET: -1229.4 mL      CAPILLARY BLOOD GLUCOSE      POCT Blood Glucose.: 104 mg/dL (19 Sep 2024 19:28)      PHYSICAL EXAM:  General: NAD, non toxic appearing  HEENT: dry MM, EOMI  Neck: supple  Respiratory: CTA b/l  Cardiovascular: s1s2 RRR  Abdomen: soft, non tender, non distended, +ostomy w/ gas and stool,   Extremities: warm, no edema or clubbing  Skin: intact  Neurological: no focal deficits  Psychiatry: Lon tate    LINES:  [facility] MEDICATIONS:  MEDICATIONS  (STANDING):  chlorhexidine 2% Cloths 1 Application(s) Topical <User Schedule>  heparin   Injectable 5000 Unit(s) SubCutaneous every 8 hours  influenza   Vaccine 0.5 milliLiter(s) IntraMuscular once  lactated ringers. 1000 milliLiter(s) (100 mL/Hr) IV Continuous <Continuous>  pantoprazole  Injectable 40 milliGRAM(s) IV Push daily  piperacillin/tazobactam IVPB.. 3.375 Gram(s) IV Intermittent every 8 hours    MEDICATIONS  (PRN):  HYDROmorphone  Injectable 0.5 milliGRAM(s) IV Push every 4 hours PRN Moderate Pain (4 - 6)  HYDROmorphone  Injectable 1 milliGRAM(s) IV Push every 4 hours PRN Severe Pain (7 - 10)      LABS:                        12.4   14.73 )-----------( 252      ( 20 Sep 2024 04:27 )             37.4     Hgb Trend: 12.4<--, 14.3<--, 16.8<--  09-20    136  |  105  |  11  ----------------------------<  90  4.2   |  25  |  0.81    Ca    8.0[L]      20 Sep 2024 04:27  Phos  2.0     09-20  Mg     1.8     09-20    TPro  4.9[L]  /  Alb  2.2[L]  /  TBili  0.7  /  DBili  x   /  AST  51[H]  /  ALT  20  /  AlkPhos  69  09-20    PT/INR - ( 19 Sep 2024 03:00 )   PT: 11.8 sec;   INR: 0.99 ratio         PTT - ( 19 Sep 2024 03:00 )  PTT:27.4 sec  Urinalysis Basic - ( 20 Sep 2024 04:27 )    Color: x / Appearance: x / SG: x / pH: x  Gluc: 90 mg/dL / Ketone: x  / Bili: x / Urobili: x   Blood: x / Protein: x / Nitrite: x   Leuk Esterase: x / RBC: x / WBC x   Sq Epi: x / Non Sq Epi: x / Bacteria: x      Arterial Blood Gas:  09-19 @ 08:28  7.30/49/132/24/96.8/-2.9  ABG lactate: --        MICROBIOLOGY:     RADIOLOGY:  [ ] Reviewed and interpreted by me

## 2024-09-20 NOTE — DIETITIAN INITIAL EVALUATION ADULT - OTHER INFO
Per chart review, pt s/p surgery 09/19 exploratory laparotomy, Dinorah's procedure for LBO, colovesical fistula.  Pt educated on clear fluid diet therapy, encouraged consumption of Ensure Clear which is provided on tray 3 x day.

## 2024-09-20 NOTE — PHYSICAL THERAPY INITIAL EVALUATION ADULT - IMPAIRED TRANSFERS: SIT/STAND, REHAB EVAL
decreased flexibility/impaired motor control/pain/impaired postural control decreased flexibility/impaired motor control/pain/impaired postural control/decreased strength

## 2024-09-20 NOTE — PHYSICAL THERAPY INITIAL EVALUATION ADULT - IMPAIRMENTS CONTRIBUTING TO GAIT DEVIATIONS, PT EVAL
decreased flexibility/impaired motor control/pain/impaired postural control/decreased ROM decreased flexibility/pain/impaired postural control/decreased ROM/decreased strength

## 2024-09-20 NOTE — DIETITIAN INITIAL EVALUATION ADULT - NSFNSGIIOFT_GEN_A_CORE
09-19-24 @ 07:01  -  09-20-24 @ 07:00  --------------------------------------------------------  OUT:    Colostomy (mL): 650 mL  Total OUT: 650 mL    Total NET: -650 mL      09-20-24 @ 07:01  -  09-20-24 @ 14:50  --------------------------------------------------------  OUT:    Colostomy (mL): 350 mL  Total OUT: 350 mL    Total NET: -350 mL

## 2024-09-20 NOTE — PROVIDER CONTACT NOTE (CRITICAL VALUE NOTIFICATION) - NS PROVIDER READ BACK TO LAB
Patient calls reporting that she is a previous patient at [facility], but as of January 1, her insurance is changing and she needs to find an orthopedic provider at [place].  Pt reports that she had injured her left knee and had an MRI done which reported a torn meniscus.  Per patient, she was told by the surgeon at [facility] that she needed surgery.  Pt reports that she is leaving at the end of January to go to [place] and would like to have her knee assessed and discuss surgical options.  Patient is given appt with Dr. Castillo on 1/4/17.     yes

## 2024-09-20 NOTE — PHYSICAL THERAPY INITIAL EVALUATION ADULT - GAIT DEVIATIONS NOTED, PT EVAL
decreased marlena/increased time in double stance/decreased velocity of limb motion/decreased step length/decreased stride length/decreased weight-shifting ability

## 2024-09-20 NOTE — PHYSICAL THERAPY INITIAL EVALUATION ADULT - GENERAL OBSERVATIONS, REHAB EVAL
Chart (EMR) reviewed. Received seated in bedside recliner chair, NAD; +andre, +ESAU drain, +stoma; family present at bedside. Pt is agreeable to participate. Alert. Ox4. Able to follow multistep commands/directions. Chart (EMR) reviewed. Received seated in bedside recliner chair, NAD; +4LO2 via NC, +andre, +ESAU drain, +colostomy; family present at bedside. Pt is agreeable to participate. Alert. Ox4. Able to follow multistep commands/directions.

## 2024-09-20 NOTE — PHYSICAL THERAPY INITIAL EVALUATION ADULT - ADDITIONAL COMMENTS
Pt lives c his wife in an apartment, [address] c B/L [address]'s, once inside everything is located on the same floor. Wife will be able to assist pt post-op.

## 2024-09-20 NOTE — PHYSICAL THERAPY INITIAL EVALUATION ADULT - CRITERIA FOR SKILLED THERAPEUTIC INTERVENTIONS
impairments found/functional limitations in following categories/risk reduction/prevention impairments found/functional limitations in following categories/risk reduction/prevention/rehab potential/therapy frequency/predicted duration of therapy intervention/anticipated discharge recommendation

## 2024-09-20 NOTE — DIETITIAN INITIAL EVALUATION ADULT - PHYSCIAL ASSESSMENT
Pt stated that he is unsure of his usual wt., thought he was 150 LBS but weighed at 175 LBS on adm.  Pt without visible findings of muscle/fat loss

## 2024-09-20 NOTE — PHYSICAL THERAPY INITIAL EVALUATION ADULT - IMPAIRMENTS FOUND, PT EVAL
ergonomics and body mechanics/gait, locomotion, and balance/gross motor/neuromotor development and sensory integration/ROM aerobic capacity/endurance/ergonomics and body mechanics/gait, locomotion, and balance/gross motor/neuromotor development and sensory integration/ROM/ventilation and respiration/gas exchange

## 2024-09-20 NOTE — DIETITIAN INITIAL EVALUATION ADULT - ORAL INTAKE PTA/DIET HISTORY
Pt reports poor appetite x 1 week PTA, had a feeling of fulness every time he tried to eat.  Diet PTA: unrestricted.  Pt without report of chewing/swallowing difficulty/food allergies/intolerances.   Pt c c/o nausea, abdominal discomfort @ present.

## 2024-09-20 NOTE — DIETITIAN INITIAL EVALUATION ADULT - PERTINENT LABORATORY DATA
09-20    136  |  105  |  11  ----------------------------<  90  4.2   |  25  |  0.81    Ca    8.0[L]      20 Sep 2024 04:27  Phos  2.0     09-20  Mg     1.8     09-20    TPro  4.9[L]  /  Alb  2.2[L]  /  TBili  0.7  /  DBili  x   /  AST  51[H]  /  ALT  20  /  AlkPhos  69  09-20  POCT Blood Glucose.: 101 mg/dL (09-20-24 @ 11:39)  
Yes

## 2024-09-20 NOTE — PHYSICAL THERAPY INITIAL EVALUATION ADULT - RANGE OF MOTION EXAMINATION, REHAB EVAL
trunk flexion limited due to pain/bilateral upper extremity ROM was WNL (within normal limits)/bilateral lower extremity was ROM was WNL (within normal limits)/deficits as listed below

## 2024-09-20 NOTE — DIETITIAN INITIAL EVALUATION ADULT - PERTINENT MEDS FT
MEDICATIONS  (STANDING):  acetaminophen   IVPB .. 1000 milliGRAM(s) IV Intermittent every 8 hours  chlorhexidine 2% Cloths 1 Application(s) Topical <User Schedule>  heparin   Injectable 5000 Unit(s) SubCutaneous every 8 hours  influenza   Vaccine 0.5 milliLiter(s) IntraMuscular once  lactated ringers. 1000 milliLiter(s) (100 mL/Hr) IV Continuous <Continuous>  pantoprazole  Injectable 40 milliGRAM(s) IV Push daily  piperacillin/tazobactam IVPB.. 3.375 Gram(s) IV Intermittent every 8 hours    MEDICATIONS  (PRN):  benzocaine/menthol Lozenge 1 Lozenge Oral every 3 hours PRN Sore Throat  HYDROmorphone  Injectable 0.5 milliGRAM(s) IV Push every 4 hours PRN Moderate Pain (4 - 6)  HYDROmorphone  Injectable 1 milliGRAM(s) IV Push every 4 hours PRN Severe Pain (7 - 10)  ondansetron Injectable 4 milliGRAM(s) IV Push every 6 hours PRN Nausea and/or Vomiting

## 2024-09-20 NOTE — PHYSICAL THERAPY INITIAL EVALUATION ADULT - PERTINENT HX OF CURRENT PROBLEM, REHAB EVAL
Patient is a 47 y/o male admitted to [facility] for large bowel obstruction. Pt c lower abdominal pain for the last few days & 2 day hx of vomiting. CT A/P revealed narrowed segment of sigmoid colon shows marked wall thickening and associated mucosal hyperenhancement; it is the explicit culprit for the large bowel obstruction, abscess as complication from colovesical fistula formation. US Testes revealed small mildly complex left hydrocele.

## 2024-09-20 NOTE — DIETITIAN INITIAL EVALUATION ADULT - OTHER CALCULATIONS
09/19, 85.7 kg(drug dose/adm wt.)  09/20, 83.2 kg(daily wt.), wt. decrease of 2.5 kg since adm noted, ? wt. accuracy

## 2024-09-20 NOTE — PROGRESS NOTE ADULT - SUBJECTIVE AND OBJECTIVE BOX
SURGERY PROGRESS HPI:  POD#1  Pt seen and examined at bedside. Pain is well controlled on pain medication. Pt denies complaints. Pt tolerating NGT. Pt denies nausea and vomiting. +Ostomy function. Pt denies chest pain, SOB, dizziness, fever, chills.     Vital Signs Last 24 Hrs  T(C): 38.1 (20 Sep 2024 05:00), Max: 38.1 (19 Sep 2024 16:00)  T(F): 100.5 (20 Sep 2024 05:00), Max: 100.6 (19 Sep 2024 16:00)  HR: 107 (20 Sep 2024 06:00) (80 - 120)  BP: 108/69 (20 Sep 2024 06:00) (96/63 - 212/115)  BP(mean): 79 (20 Sep 2024 06:00) (72 - 143)  RR: 18 (20 Sep 2024 06:00) (10 - 28)  SpO2: 94% (20 Sep 2024 06:00) (85% - 100%)    Parameters below as of 19 Sep 2024 19:00  Patient On (Oxygen Delivery Method): room air    PHYSICAL EXAM:    CONSTITUTIONAL: NAD  HEENT:  Atraumatic, Normocephalic  RESPIRATORY: Clear to ausculation, bilaterally   CARDIOVASCULAR: RRR S1S2  GASTROINTESTINAL: Ostomy dark with stool/air in the bag. Moses with serosanguinous output. Dressing clean/dry/intact. non distended, +BS, soft, appropriate incisional tenderness  : Andre indwelling with clear yellow urine output  MUSCULOSKELETAL: calf soft, non tender b/l    I&O's Detail    19 Sep 2024 07:01  -  20 Sep 2024 06:21  --------------------------------------------------------  IN:    IV PiggyBack: 400 mL    Lactated Ringers: 2300 mL    Propofol: 20.6 mL  Total IN: 2720.6 mL    OUT:    Bulb (mL): 565 mL    Colostomy (mL): 650 mL    Indwelling Catheter - Urethral (mL): 2735 mL  Total OUT: 3950 mL    Total NET: -1229.4 mL    LABS:                        12.4   14.73 )-----------( 252      ( 20 Sep 2024 04:27 )             37.4     09-20    136  |  105  |  11  ----------------------------<  90  4.2   |  25  |  0.81    Ca    8.0[L]      20 Sep 2024 04:27  Phos  2.0     09-20  Mg     1.8     09-20    TPro  4.9[L]  /  Alb  2.2[L]  /  TBili  0.7  /  DBili  x   /  AST  51[H]  /  ALT  20  /  AlkPhos  69  09-20    PT/INR - ( 19 Sep 2024 03:00 )   PT: 11.8 sec;   INR: 0.99 ratio      PTT - ( 19 Sep 2024 03:00 )  PTT:27.4 sec    Urinalysis Basic - ( 20 Sep 2024 04:27 )    Color: x / Appearance: x / SG: x / pH: x  Gluc: 90 mg/dL / Ketone: x  / Bili: x / Urobili: x   Blood: x / Protein: x / Nitrite: x   Leuk Esterase: x / RBC: x / WBC x   Sq Epi: x / Non Sq Epi: x / Bacteria: x      A/P: 47 yo M with PMH of  anixety, right epididymoorchitis in April, current smoker, former ETOH (sober 11 years), COPD presents with lower abdominal pain for a few days, found with 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   - IV abx   - NPO, IVF  - NGT, monitor output  - recommend tylenol ATC, narcotics prn   - continue andre. do NOT remove andre.  - drain care, monitor output.   - local wound care per surgery team   - DVT ppx, GI ppx    SURGERY PROGRESS HPI:  POD#1  Pt seen and examined at bedside. Pain is well controlled on pain medication. Pt denies complaints. Pt tolerating NGT. Pt denies nausea and vomiting. +Ostomy function. Pt denies chest pain, SOB, dizziness, fever, chills.     Vital Signs Last 24 Hrs  T(C): 38.1 (20 Sep 2024 05:00), Max: 38.1 (19 Sep 2024 16:00)  T(F): 100.5 (20 Sep 2024 05:00), Max: 100.6 (19 Sep 2024 16:00)  HR: 107 (20 Sep 2024 06:00) (80 - 120)  BP: 108/69 (20 Sep 2024 06:00) (96/63 - 212/115)  BP(mean): 79 (20 Sep 2024 06:00) (72 - 143)  RR: 18 (20 Sep 2024 06:00) (10 - 28)  SpO2: 94% (20 Sep 2024 06:00) (85% - 100%)    Parameters below as of 19 Sep 2024 19:00  Patient On (Oxygen Delivery Method): room air    PHYSICAL EXAM:    CONSTITUTIONAL: NAD  HEENT:  Atraumatic, Normocephalic  RESPIRATORY: Clear to ausculation, bilaterally   CARDIOVASCULAR: RRR S1S2  GASTROINTESTINAL: Ostomy dark with stool/air in the bag. Moses with serosanguinous output. Dressing and wound clean/dry/intact, intermittent staples intact, dressing changed. non distended, +BS, soft, appropriate incisional tenderness  : Andre indwelling with clear yellow urine output  MUSCULOSKELETAL: calf soft, non tender b/l    I&O's Detail    19 Sep 2024 07:01  -  20 Sep 2024 06:21  --------------------------------------------------------  IN:    IV PiggyBack: 400 mL    Lactated Ringers: 2300 mL    Propofol: 20.6 mL  Total IN: 2720.6 mL    OUT:    Bulb (mL): 565 mL    Colostomy (mL): 650 mL    Indwelling Catheter - Urethral (mL): 2735 mL  Total OUT: 3950 mL    Total NET: -1229.4 mL    LABS:                        12.4   14.73 )-----------( 252      ( 20 Sep 2024 04:27 )             37.4     09-20    136  |  105  |  11  ----------------------------<  90  4.2   |  25  |  0.81    Ca    8.0[L]      20 Sep 2024 04:27  Phos  2.0     09-20  Mg     1.8     09-20    TPro  4.9[L]  /  Alb  2.2[L]  /  TBili  0.7  /  DBili  x   /  AST  51[H]  /  ALT  20  /  AlkPhos  69  09-20    PT/INR - ( 19 Sep 2024 03:00 )   PT: 11.8 sec;   INR: 0.99 ratio      PTT - ( 19 Sep 2024 03:00 )  PTT:27.4 sec    Urinalysis Basic - ( 20 Sep 2024 04:27 )    Color: x / Appearance: x / SG: x / pH: x  Gluc: 90 mg/dL / Ketone: x  / Bili: x / Urobili: x   Blood: x / Protein: x / Nitrite: x   Leuk Esterase: x / RBC: x / WBC x   Sq Epi: x / Non Sq Epi: x / Bacteria: x      A/P: 49 yo M with PMH of  anixety, right epididymoorchitis in April, current smoker, former ETOH (sober 11 years), COPD presents with lower abdominal pain for a few days, found with 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   - IV abx   - NPO, IVF  - NGT, monitor output  - recommend tylenol ATC, narcotics prn   - continue andre. do NOT remove andre.  - drain care, monitor output.   - local wound care per surgery team   - DVT ppx, GI ppx

## 2024-09-21 LAB
-  AMOXICILLIN/CLAVULANIC ACID: SIGNIFICANT CHANGE UP
-  AMPICILLIN/SULBACTAM: SIGNIFICANT CHANGE UP
-  AMPICILLIN: SIGNIFICANT CHANGE UP
-  AZTREONAM: SIGNIFICANT CHANGE UP
-  CEFAZOLIN: SIGNIFICANT CHANGE UP
-  CEFEPIME: SIGNIFICANT CHANGE UP
-  CEFOXITIN: SIGNIFICANT CHANGE UP
-  CEFTRIAXONE: SIGNIFICANT CHANGE UP
-  CIPROFLOXACIN: SIGNIFICANT CHANGE UP
-  ERTAPENEM: SIGNIFICANT CHANGE UP
-  GENTAMICIN: SIGNIFICANT CHANGE UP
-  IMIPENEM: SIGNIFICANT CHANGE UP
-  LEVOFLOXACIN: SIGNIFICANT CHANGE UP
-  MEROPENEM: SIGNIFICANT CHANGE UP
-  PIPERACILLIN/TAZOBACTAM: SIGNIFICANT CHANGE UP
-  TOBRAMYCIN: SIGNIFICANT CHANGE UP
-  TRIMETHOPRIM/SULFAMETHOXAZOLE: SIGNIFICANT CHANGE UP
ALBUMIN SERPL ELPH-MCNC: 2 G/DL — LOW (ref 3.3–5)
ALP SERPL-CCNC: 62 U/L — SIGNIFICANT CHANGE UP (ref 40–120)
ALT FLD-CCNC: 23 U/L — SIGNIFICANT CHANGE UP (ref 12–78)
ANION GAP SERPL CALC-SCNC: 6 MMOL/L — SIGNIFICANT CHANGE UP (ref 5–17)
AST SERPL-CCNC: 53 U/L — HIGH (ref 15–37)
BASOPHILS # BLD AUTO: 0.05 K/UL — SIGNIFICANT CHANGE UP (ref 0–0.2)
BASOPHILS NFR BLD AUTO: 0.3 % — SIGNIFICANT CHANGE UP (ref 0–2)
BILIRUB SERPL-MCNC: 0.5 MG/DL — SIGNIFICANT CHANGE UP (ref 0.2–1.2)
BUN SERPL-MCNC: 7 MG/DL — SIGNIFICANT CHANGE UP (ref 7–23)
CALCIUM SERPL-MCNC: 7.8 MG/DL — LOW (ref 8.5–10.1)
CHLORIDE SERPL-SCNC: 106 MMOL/L — SIGNIFICANT CHANGE UP (ref 96–108)
CO2 SERPL-SCNC: 26 MMOL/L — SIGNIFICANT CHANGE UP (ref 22–31)
CREAT SERPL-MCNC: 0.68 MG/DL — SIGNIFICANT CHANGE UP (ref 0.5–1.3)
EGFR: 113 ML/MIN/1.73M2 — SIGNIFICANT CHANGE UP
EOSINOPHIL # BLD AUTO: 0.02 K/UL — SIGNIFICANT CHANGE UP (ref 0–0.5)
EOSINOPHIL NFR BLD AUTO: 0.1 % — SIGNIFICANT CHANGE UP (ref 0–6)
GLUCOSE SERPL-MCNC: 104 MG/DL — HIGH (ref 70–99)
HCT VFR BLD CALC: 34.1 % — LOW (ref 39–50)
HGB BLD-MCNC: 11.5 G/DL — LOW (ref 13–17)
IMM GRANULOCYTES NFR BLD AUTO: 0.5 % — SIGNIFICANT CHANGE UP (ref 0–0.9)
LYMPHOCYTES # BLD AUTO: 1.63 K/UL — SIGNIFICANT CHANGE UP (ref 1–3.3)
LYMPHOCYTES # BLD AUTO: 10.6 % — LOW (ref 13–44)
MAGNESIUM SERPL-MCNC: 2 MG/DL — SIGNIFICANT CHANGE UP (ref 1.6–2.6)
MCHC RBC-ENTMCNC: 31.6 PG — SIGNIFICANT CHANGE UP (ref 27–34)
MCHC RBC-ENTMCNC: 33.7 G/DL — SIGNIFICANT CHANGE UP (ref 32–36)
MCV RBC AUTO: 93.7 FL — SIGNIFICANT CHANGE UP (ref 80–100)
METHOD TYPE: SIGNIFICANT CHANGE UP
MONOCYTES # BLD AUTO: 1.14 K/UL — HIGH (ref 0–0.9)
MONOCYTES NFR BLD AUTO: 7.4 % — SIGNIFICANT CHANGE UP (ref 2–14)
NEUTROPHILS # BLD AUTO: 12.43 K/UL — HIGH (ref 1.8–7.4)
NEUTROPHILS NFR BLD AUTO: 81.1 % — HIGH (ref 43–77)
NRBC # BLD: 0 /100 WBCS — SIGNIFICANT CHANGE UP (ref 0–0)
PHOSPHATE SERPL-MCNC: 1.6 MG/DL — LOW (ref 2.5–4.5)
PLATELET # BLD AUTO: 245 K/UL — SIGNIFICANT CHANGE UP (ref 150–400)
POTASSIUM SERPL-MCNC: 3.8 MMOL/L — SIGNIFICANT CHANGE UP (ref 3.5–5.3)
POTASSIUM SERPL-SCNC: 3.8 MMOL/L — SIGNIFICANT CHANGE UP (ref 3.5–5.3)
PROT SERPL-MCNC: 4.9 GM/DL — LOW (ref 6–8.3)
RBC # BLD: 3.64 M/UL — LOW (ref 4.2–5.8)
RBC # FLD: 13.3 % — SIGNIFICANT CHANGE UP (ref 10.3–14.5)
SODIUM SERPL-SCNC: 138 MMOL/L — SIGNIFICANT CHANGE UP (ref 135–145)
WBC # BLD: 15.34 K/UL — HIGH (ref 3.8–10.5)
WBC # FLD AUTO: 15.34 K/UL — HIGH (ref 3.8–10.5)

## 2024-09-21 PROCEDURE — 99233 SBSQ HOSP IP/OBS HIGH 50: CPT

## 2024-09-21 RX ORDER — ACETAMINOPHEN 325 MG
1000 TABLET ORAL ONCE
Refills: 0 | Status: COMPLETED | OUTPATIENT
Start: 2024-09-21 | End: 2024-09-21

## 2024-09-21 RX ORDER — OXYCODONE HYDROCHLORIDE 30 MG/1
5 TABLET, FILM COATED, EXTENDED RELEASE ORAL EVERY 6 HOURS
Refills: 0 | Status: DISCONTINUED | OUTPATIENT
Start: 2024-09-21 | End: 2024-09-22

## 2024-09-21 RX ORDER — POTASSIUM PHOSPHATE, MONOBASIC POTASSIUM PHOSPHATE, DIBASIC 224; 236 MG/ML; MG/ML
15 INJECTION, SOLUTION, CONCENTRATE INTRAVENOUS ONCE
Refills: 0 | Status: COMPLETED | OUTPATIENT
Start: 2024-09-21 | End: 2024-09-21

## 2024-09-21 RX ADMIN — IPRATROPIUM BROMIDE AND ALBUTEROL SULFATE 3 MILLILITER(S): .5; 3 SOLUTION RESPIRATORY (INHALATION) at 11:05

## 2024-09-21 RX ADMIN — PIPERACILLIN SODIUM AND TAZOBACTAM SODIUM 25 GRAM(S): 12; 1.5 INJECTION, POWDER, LYOPHILIZED, FOR SOLUTION INTRAVENOUS at 06:30

## 2024-09-21 RX ADMIN — Medication 5000 UNIT(S): at 13:27

## 2024-09-21 RX ADMIN — Medication 5000 UNIT(S): at 06:30

## 2024-09-21 RX ADMIN — POTASSIUM PHOSPHATE, MONOBASIC POTASSIUM PHOSPHATE, DIBASIC 62.5 MILLIMOLE(S): 224; 236 INJECTION, SOLUTION, CONCENTRATE INTRAVENOUS at 06:30

## 2024-09-21 RX ADMIN — CHLORHEXIDINE GLUCONATE ORAL RINSE 1 APPLICATION(S): 1.2 SOLUTION DENTAL at 06:31

## 2024-09-21 RX ADMIN — Medication 4 MILLIGRAM(S): at 04:25

## 2024-09-21 RX ADMIN — Medication 1000 MILLIGRAM(S): at 07:11

## 2024-09-21 RX ADMIN — Medication 1 PATCH: at 11:24

## 2024-09-21 RX ADMIN — OXYCODONE HYDROCHLORIDE 5 MILLIGRAM(S): 30 TABLET, FILM COATED, EXTENDED RELEASE ORAL at 18:21

## 2024-09-21 RX ADMIN — HYDROMORPHONE HYDROCHLORIDE 0.5 MILLIGRAM(S): 1 INJECTION, SOLUTION INTRAMUSCULAR; INTRAVENOUS; SUBCUTANEOUS at 10:10

## 2024-09-21 RX ADMIN — PIPERACILLIN SODIUM AND TAZOBACTAM SODIUM 25 GRAM(S): 12; 1.5 INJECTION, POWDER, LYOPHILIZED, FOR SOLUTION INTRAVENOUS at 13:27

## 2024-09-21 RX ADMIN — Medication 400 MILLIGRAM(S): at 21:14

## 2024-09-21 RX ADMIN — OXYCODONE HYDROCHLORIDE 5 MILLIGRAM(S): 30 TABLET, FILM COATED, EXTENDED RELEASE ORAL at 17:21

## 2024-09-21 RX ADMIN — PIPERACILLIN SODIUM AND TAZOBACTAM SODIUM 25 GRAM(S): 12; 1.5 INJECTION, POWDER, LYOPHILIZED, FOR SOLUTION INTRAVENOUS at 21:14

## 2024-09-21 RX ADMIN — Medication 400 MILLIGRAM(S): at 06:30

## 2024-09-21 RX ADMIN — HYDROMORPHONE HYDROCHLORIDE 0.5 MILLIGRAM(S): 1 INJECTION, SOLUTION INTRAMUSCULAR; INTRAVENOUS; SUBCUTANEOUS at 11:10

## 2024-09-21 RX ADMIN — IPRATROPIUM BROMIDE AND ALBUTEROL SULFATE 3 MILLILITER(S): .5; 3 SOLUTION RESPIRATORY (INHALATION) at 17:08

## 2024-09-21 RX ADMIN — Medication 5000 UNIT(S): at 21:14

## 2024-09-21 RX ADMIN — Medication 1000 MILLIGRAM(S): at 15:08

## 2024-09-21 RX ADMIN — Medication 400 MILLIGRAM(S): at 13:28

## 2024-09-21 RX ADMIN — PANTOPRAZOLE SODIUM 40 MILLIGRAM(S): 40 TABLET, DELAYED RELEASE ORAL at 11:24

## 2024-09-21 NOTE — PROGRESS NOTE ADULT - NS ATTEST RISK PROBLEM GEN_ALL_CORE FT
SALENA s/p ex-tawnya payne/ caberra
LBO s/p ex-lap and Peters's procedure, colovesical repair, post-op respiratory insufficiency, chronic smoker, hypoxia

## 2024-09-21 NOTE — PROGRESS NOTE ADULT - NS PANP COMMENT GEN_ALL_CORE FT
Patient seen and examined with PA  Doing well  Tolerated some clear liquids  Abd is soft, not distended, appropriately tender  Colostomy with stool output  Drain in place; serosanguinous  Andre in place    - advance to regular diet  - continue IV antibiotics  - do NOT remove andre; andre to remain for 2 weeks due to bladder repair

## 2024-09-21 NOTE — PROGRESS NOTE ADULT - SUBJECTIVE AND OBJECTIVE BOX
CHIEF COMPLAINT:    Interval Events:    REVIEW OF SYSTEMS:  Constitutional: [ ] fevers [ ] chills [ ] weight loss [ ] weight gain  HEENT: [ ] dry eyes [ ] eye irritation [ ] postnasal drip [ ] nasal congestion  CV: [ ] chest pain [ ] orthopnea [ ] palpitations [ ] murmur  Resp: [ ] cough [ ] shortness of breath [ ] dyspnea [ ] wheezing [ ] sputum [ ] hemoptysis  GI: [ ] nausea [ ] vomiting [ ] diarrhea [ ] constipation [ ] abd pain [ ] dysphagia   : [ ] dysuria [ ] nocturia [ ] hematuria [ ] increased urinary frequency  Musculoskeletal: [ ] back pain [ ] myalgias [ ] arthralgias [ ] fracture  Skin: [ ] rash [ ] itch  Neurological: [ ] headache [ ] dizziness [ ] syncope [ ] weakness [ ] numbness  Hematologic/Lymphatic: [ ] anemia [ ] bleeding problem  Allergic/Immunologic: [ ] itchy eyes [ ] nasal discharge [ ] hives [ ] angioedema  [ ] All other systems negative  [ ] Unable to assess ROS because ________    OBJECTIVE:  ICU Vital Signs Last 24 Hrs  T(C): 37.1 (21 Sep 2024 04:30), Max: 38.4 (20 Sep 2024 13:00)  T(F): 98.7 (21 Sep 2024 04:30), Max: 101.1 (20 Sep 2024 13:00)  HR: 83 (21 Sep 2024 07:30) (74 - 110)  BP: 112/64 (21 Sep 2024 07:00) (92/49 - 124/81)  BP(mean): 79 (21 Sep 2024 07:00) (57 - 91)  ABP: --  ABP(mean): --  RR: 26 (21 Sep 2024 07:30) (11 - 34)  SpO2: 93% (21 Sep 2024 07:30) (85% - 99%)    O2 Parameters below as of 21 Sep 2024 00:09  Patient On (Oxygen Delivery Method): nasal cannula              09-20 @ 07:01  -  09-21 @ 07:00  --------------------------------------------------------  IN: 1870 mL / OUT: 3145 mL / NET: -1275 mL      CAPILLARY BLOOD GLUCOSE      POCT Blood Glucose.: 101 mg/dL (20 Sep 2024 11:39)      PHYSICAL EXAM:  General:   HEENT:   Neck:   Respiratory:   Cardiovascular:   Abdomen:   Extremities:   Skin:   Neurological:  Psychiatry:    LINES:    HOSPITAL MEDICATIONS:  MEDICATIONS  (STANDING):  albuterol/ipratropium for Nebulization 3 milliLiter(s) Nebulizer every 6 hours  chlorhexidine 2% Cloths 1 Application(s) Topical <User Schedule>  heparin   Injectable 5000 Unit(s) SubCutaneous every 8 hours  influenza   Vaccine 0.5 milliLiter(s) IntraMuscular once  pantoprazole  Injectable 40 milliGRAM(s) IV Push daily  piperacillin/tazobactam IVPB.. 3.375 Gram(s) IV Intermittent every 8 hours    MEDICATIONS  (PRN):  benzocaine/menthol Lozenge 1 Lozenge Oral every 3 hours PRN Sore Throat  HYDROmorphone  Injectable 1 milliGRAM(s) IV Push every 4 hours PRN Severe Pain (7 - 10)  HYDROmorphone  Injectable 0.5 milliGRAM(s) IV Push every 4 hours PRN Moderate Pain (4 - 6)  ondansetron Injectable 4 milliGRAM(s) IV Push every 6 hours PRN Nausea and/or Vomiting      LABS:                        11.5   15.34 )-----------( 245      ( 21 Sep 2024 04:12 )             34.1     Hgb Trend: 11.5<--, 12.4<--, 14.3<--, 16.8<--  09-21    138  |  106  |  7   ----------------------------<  104[H]  3.8   |  26  |  0.68    Ca    7.8[L]      21 Sep 2024 04:12  Phos  1.6     09-21  Mg     2.0     09-21    TPro  4.9[L]  /  Alb  2.0[L]  /  TBili  0.5  /  DBili  x   /  AST  53[H]  /  ALT  23  /  AlkPhos  62  09-21      Urinalysis Basic - ( 21 Sep 2024 04:12 )    Color: x / Appearance: x / SG: x / pH: x  Gluc: 104 mg/dL / Ketone: x  / Bili: x / Urobili: x   Blood: x / Protein: x / Nitrite: x   Leuk Esterase: x / RBC: x / WBC x   Sq Epi: x / Non Sq Epi: x / Bacteria: x      Arterial Blood Gas:  09-20 @ 21:01  7.47/45/81/33/97.6/8.0  ABG lactate: --  Arterial Blood Gas:  09-19 @ 08:28  7.30/49/132/24/96.8/-2.9  ABG lactate: --        MICROBIOLOGY:     RADIOLOGY:  [ ] Reviewed and interpreted by me CHIEF COMPLAINT:    Interval Events:  E coli growing in cultures  desaturated overnight, possible atelectasis, improved w/ bronchodilators and chest PT  abdominal pain improved but when he has gas pain it is very painful    REVIEW OF SYSTEMS:  Constitutional: [- ] fevers [ -] chills [ ] weight loss [ ] weight gain  HEENT: [ ] dry eyes [ ] eye irritation [ ] postnasal drip [ ] nasal congestion  CV: [ -] chest pain [- ] orthopnea [- ] palpitations [ ] murmur  Resp: [ -] cough [- ] shortness of breath [- ] dyspnea [- ] wheezing [- ] sputum [- ] hemoptysis  GI: [ -] nausea [- ] vomiting [- ] diarrhea [- ] constipation [- ] abd pain [ ] dysphagia   : [ ] dysuria [ ] nocturia [ ] hematuria [ ] increased urinary frequency  Musculoskeletal: [- ] back pain [- ] myalgias [- ] arthralgias [ ] fracture  Skin: [ ] rash [ ] itch  Neurological: [ -] headache [- ] dizziness [- ] syncope [- ] weakness [ ] numbness  Hematologic/Lymphatic: [ ] anemia [ ] bleeding problem  Allergic/Immunologic: [ ] itchy eyes [ ] nasal discharge [ ] hives [ ] angioedema  [x ] All other systems negative    OBJECTIVE:  ICU Vital Signs Last 24 Hrs  T(C): 37.1 (21 Sep 2024 04:30), Max: 38.4 (20 Sep 2024 13:00)  T(F): 98.7 (21 Sep 2024 04:30), Max: 101.1 (20 Sep 2024 13:00)  HR: 83 (21 Sep 2024 07:30) (74 - 110)  BP: 112/64 (21 Sep 2024 07:00) (92/49 - 124/81)  BP(mean): 79 (21 Sep 2024 07:00) (57 - 91)  ABP: --  ABP(mean): --  RR: 26 (21 Sep 2024 07:30) (11 - 34)  SpO2: 93% (21 Sep 2024 07:30) (85% - 99%)    O2 Parameters below as of 21 Sep 2024 00:09  Patient On (Oxygen Delivery Method): nasal cannula              09-20 @ 07:01  -  09-21 @ 07:00  --------------------------------------------------------  IN: 1870 mL / OUT: 3145 mL / NET: -1275 mL      CAPILLARY BLOOD GLUCOSE      POCT Blood Glucose.: 101 mg/dL (20 Sep 2024 11:39)      PHYSICAL EXAM:  General: NAD, non toxic appearing  HEENT: dry MM, EOMI  Neck: supple  Respiratory: CTA b/l  Cardiovascular: s1s2 RRR  Abdomen: soft, non tender, non distended, +ostomy w/ gas and stool, although ostomy looks black  Extremities: warm, no edema or clubbing  Skin: midline incision CDI  Neurological: no focal deficits  Psychiatry: Lon tate    LINES:  [facility] MEDICATIONS:  MEDICATIONS  (STANDING):  albuterol/ipratropium for Nebulization 3 milliLiter(s) Nebulizer every 6 hours  chlorhexidine 2% Cloths 1 Application(s) Topical <User Schedule>  heparin   Injectable 5000 Unit(s) SubCutaneous every 8 hours  influenza   Vaccine 0.5 milliLiter(s) IntraMuscular once  pantoprazole  Injectable 40 milliGRAM(s) IV Push daily  piperacillin/tazobactam IVPB.. 3.375 Gram(s) IV Intermittent every 8 hours    MEDICATIONS  (PRN):  benzocaine/menthol Lozenge 1 Lozenge Oral every 3 hours PRN Sore Throat  HYDROmorphone  Injectable 1 milliGRAM(s) IV Push every 4 hours PRN Severe Pain (7 - 10)  HYDROmorphone  Injectable 0.5 milliGRAM(s) IV Push every 4 hours PRN Moderate Pain (4 - 6)  ondansetron Injectable 4 milliGRAM(s) IV Push every 6 hours PRN Nausea and/or Vomiting      LABS:                        11.5   15.34 )-----------( 245      ( 21 Sep 2024 04:12 )             34.1     Hgb Trend: 11.5<--, 12.4<--, 14.3<--, 16.8<--  09-21    138  |  106  |  7   ----------------------------<  104[H]  3.8   |  26  |  0.68    Ca    7.8[L]      21 Sep 2024 04:12  Phos  1.6     09-21  Mg     2.0     09-21    TPro  4.9[L]  /  Alb  2.0[L]  /  TBili  0.5  /  DBili  x   /  AST  53[H]  /  ALT  23  /  AlkPhos  62  09-21      Urinalysis Basic - ( 21 Sep 2024 04:12 )    Color: x / Appearance: x / SG: x / pH: x  Gluc: 104 mg/dL / Ketone: x  / Bili: x / Urobili: x   Blood: x / Protein: x / Nitrite: x   Leuk Esterase: x / RBC: x / WBC x   Sq Epi: x / Non Sq Epi: x / Bacteria: x      Arterial Blood Gas:  09-20 @ 21:01  7.47/45/81/33/97.6/8.0  ABG lactate: --  Arterial Blood Gas:  09-19 @ 08:28  7.30/49/132/24/96.8/-2.9  ABG lactate: --        MICROBIOLOGY:   Culture - Body Fluid with Gram Stain (09.19.24 @ 06:30)    Gram Stain:   Rare polymorphonuclear leukocytes per low power field  No organisms seen per oil power field   Specimen Source: Ascites Fl Swab   Culture Results:   Rare Escherichia coli        RADIOLOGY:  [ ] Reviewed and interpreted by me

## 2024-09-21 NOTE — CHART NOTE - NSCHARTNOTEFT_GEN_A_CORE
MICU DOWN GRADE NOTE  Accepting MD: dr cortez     cc: Patient is a 48y old  Male who presents with a chief complaint of LBO (21 Sep 2024 08:30)      HPI:  48M w/ anxiety, right epididymoorchitis, current smoker, former ETOH. Presents w/ abdominal pain found to have LBO. S/P OR for ex-lap w/ rogers's and repair of colovesical fistula. Admitted to ICU intubated, has since been extubated. OR cx with ecoli. Pt continued on zosyn. Pt HD stable and clinically improving.     INTERVAL HPI/OVERNIGHT EVENTS: pt was hypoxemic overnight requiring NC. CXR with atelectasis. Pt satting well on 4LNC and is HD stable for transfer back to surgery service.         MEDICATIONS:  acetaminophen   IVPB .. 1000 milliGRAM(s) IV Intermittent once  albuterol/ipratropium for Nebulization 3 milliLiter(s) Nebulizer every 6 hours  benzocaine/menthol Lozenge 1 Lozenge Oral every 3 hours PRN  chlorhexidine 2% Cloths 1 Application(s) Topical <User Schedule>  heparin   Injectable 5000 Unit(s) SubCutaneous every 8 hours  HYDROmorphone  Injectable 1 milliGRAM(s) IV Push every 4 hours PRN  HYDROmorphone  Injectable 0.5 milliGRAM(s) IV Push every 4 hours PRN  influenza   Vaccine 0.5 milliLiter(s) IntraMuscular once  nicotine -   7 mG/24Hr(s) Patch 1 Patch Transdermal daily  ondansetron Injectable 4 milliGRAM(s) IV Push every 6 hours PRN  pantoprazole  Injectable 40 milliGRAM(s) IV Push daily  piperacillin/tazobactam IVPB.. 3.375 Gram(s) IV Intermittent every 8 hours      T(C): 37.1 (09-21-24 @ 08:00), Max: 38.4 (09-20-24 @ 13:00)  HR: 80 (09-21-24 @ 09:00) (74 - 110)  BP: 115/82 (09-21-24 @ 09:00) (92/49 - 124/81)  RR: 27 (09-21-24 @ 09:00) (11 - 34)  SpO2: 93% (09-21-24 @ 09:00) (85% - 99%)  Wt(kg): --Vital Signs Last 24 Hrs  T(C): 37.1 (21 Sep 2024 08:00), Max: 38.4 (20 Sep 2024 13:00)  T(F): 98.7 (21 Sep 2024 08:00), Max: 101.1 (20 Sep 2024 13:00)  HR: 80 (21 Sep 2024 09:00) (74 - 110)  BP: 115/82 (21 Sep 2024 09:00) (92/49 - 124/81)  BP(mean): 92 (21 Sep 2024 09:00) (57 - 92)  RR: 27 (21 Sep 2024 09:00) (11 - 34)  SpO2: 93% (21 Sep 2024 09:00) (85% - 99%)    Parameters below as of 21 Sep 2024 08:00  Patient On (Oxygen Delivery Method): nasal cannula  O2 Flow (L/min): 2      Consultant(s) Notes Reviewed:  [x ] YES  [ ] NO  Care Discussed with Consultants/Other Providers [ x] YES  [ ] NO    LABS:                        11.5   15.34 )-----------( 245      ( 21 Sep 2024 04:12 )             34.1     09-21    138  |  106  |  7   ----------------------------<  104[H]  3.8   |  26  |  0.68    Ca    7.8[L]      21 Sep 2024 04:12  Phos  1.6     09-21  Mg     2.0     09-21    TPro  4.9[L]  /  Alb  2.0[L]  /  TBili  0.5  /  DBili  x   /  AST  53[H]  /  ALT  23  /  AlkPhos  62  09-21      Urinalysis Basic - ( 21 Sep 2024 04:12 )    Color: x / Appearance: x / SG: x / pH: x  Gluc: 104 mg/dL / Ketone: x  / Bili: x / Urobili: x   Blood: x / Protein: x / Nitrite: x   Leuk Esterase: x / RBC: x / WBC x   Sq Epi: x / Non Sq Epi: x / Bacteria: x      CAPILLARY BLOOD GLUCOSE      POCT Blood Glucose.: 101 mg/dL (20 Sep 2024 11:39)      ABG - ( 20 Sep 2024 21:01 )  pH, Arterial: 7.47  pH, Blood: x     /  pCO2: 45    /  pO2: 81    / HCO3: 33    / Base Excess: 8.0   /  SaO2: 97.6              Urinalysis Basic - ( 21 Sep 2024 04:12 )    Color: x / Appearance: x / SG: x / pH: x  Gluc: 104 mg/dL / Ketone: x  / Bili: x / Urobili: x   Blood: x / Protein: x / Nitrite: x   Leuk Esterase: x / RBC: x / WBC x   Sq Epi: x / Non Sq Epi: x / Bacteria: x        RADIOLOGY & ADDITIONAL TESTS:    Imaging Personally Reviewed:  [x ] YES  [ ] NO    48M w/ anxiety, right epididymoorchitis, current smoker, former ETOH. Presents w/ abdominal pain found to have LBO. S/P OR for ex-lap w/ rogers's and repair of colovesical fistula. Admitted to ICU intubated, has since been extubated.   Pt now stable for transfer back to surgical service.     To follow up:  - 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 ostomy with stool output. further mngmt per surgery  - ngt and diet advancement per surgery  - maintain andre s/p repair  - hypoxemia likely from atelectasis/abd splinting. satting well on NC. continue with adeqaute pain control, incentive spirometer, nebs and aerobika for airway clearance     d/w dr saenz and surgical ACP

## 2024-09-21 NOTE — PROGRESS NOTE ADULT - SUBJECTIVE AND OBJECTIVE BOX
SURGERY PROGRESS HPI:  Pt seen and examined at bedside.   ostomy functioning    Vital Signs Last 24 Hrs  T(C): 37.1 (21 Sep 2024 08:00), Max: 38.4 (20 Sep 2024 13:00)  T(F): 98.7 (21 Sep 2024 08:00), Max: 101.1 (20 Sep 2024 13:00)  HR: 76 (21 Sep 2024 08:00) (74 - 110)  BP: 113/76 (21 Sep 2024 08:00) (92/49 - 124/81)  BP(mean): 88 (21 Sep 2024 08:00) (57 - 91)  RR: 22 (21 Sep 2024 08:00) (11 - 34)  SpO2: 98% (21 Sep 2024 08:00) (85% - 99%)    Parameters below as of 21 Sep 2024 08:00  Patient On (Oxygen Delivery Method): nasal cannula  O2 Flow (L/min): 2        PHYSICAL EXAM:    GENERAL: NAD  HEAD:  Atraumatic, Normocephalic  CHEST/LUNG: Clear to ausculation, bilaterally   HEART: RRR   ABDOMEN: non distended, soft, appropriately tender, drain s/s, ostomy brown in color, functioning, stool in bag, midline incision with packing, staples intact, healing well  EXTREMITIES:  calf soft, non tender b/l

## 2024-09-22 LAB
ALBUMIN SERPL ELPH-MCNC: 2.1 G/DL — LOW (ref 3.3–5)
ALP SERPL-CCNC: 68 U/L — SIGNIFICANT CHANGE UP (ref 40–120)
ALT FLD-CCNC: 25 U/L — SIGNIFICANT CHANGE UP (ref 12–78)
ANION GAP SERPL CALC-SCNC: 5 MMOL/L — SIGNIFICANT CHANGE UP (ref 5–17)
AST SERPL-CCNC: 39 U/L — HIGH (ref 15–37)
BILIRUB SERPL-MCNC: 0.3 MG/DL — SIGNIFICANT CHANGE UP (ref 0.2–1.2)
BUN SERPL-MCNC: 7 MG/DL — SIGNIFICANT CHANGE UP (ref 7–23)
CALCIUM SERPL-MCNC: 8.3 MG/DL — LOW (ref 8.5–10.1)
CHLORIDE SERPL-SCNC: 109 MMOL/L — HIGH (ref 96–108)
CO2 SERPL-SCNC: 26 MMOL/L — SIGNIFICANT CHANGE UP (ref 22–31)
CREAT SERPL-MCNC: 0.7 MG/DL — SIGNIFICANT CHANGE UP (ref 0.5–1.3)
EGFR: 112 ML/MIN/1.73M2 — SIGNIFICANT CHANGE UP
GLUCOSE SERPL-MCNC: 104 MG/DL — HIGH (ref 70–99)
HCT VFR BLD CALC: 36.8 % — LOW (ref 39–50)
HGB BLD-MCNC: 11.9 G/DL — LOW (ref 13–17)
MAGNESIUM SERPL-MCNC: 1.9 MG/DL — SIGNIFICANT CHANGE UP (ref 1.6–2.6)
MCHC RBC-ENTMCNC: 31 PG — SIGNIFICANT CHANGE UP (ref 27–34)
MCHC RBC-ENTMCNC: 32.3 G/DL — SIGNIFICANT CHANGE UP (ref 32–36)
MCV RBC AUTO: 95.8 FL — SIGNIFICANT CHANGE UP (ref 80–100)
NRBC # BLD: 0 /100 WBCS — SIGNIFICANT CHANGE UP (ref 0–0)
PHOSPHATE SERPL-MCNC: 2.2 MG/DL — LOW (ref 2.5–4.5)
PLATELET # BLD AUTO: 251 K/UL — SIGNIFICANT CHANGE UP (ref 150–400)
POTASSIUM SERPL-MCNC: 4 MMOL/L — SIGNIFICANT CHANGE UP (ref 3.5–5.3)
POTASSIUM SERPL-SCNC: 4 MMOL/L — SIGNIFICANT CHANGE UP (ref 3.5–5.3)
PROT SERPL-MCNC: 5.4 GM/DL — LOW (ref 6–8.3)
RBC # BLD: 3.84 M/UL — LOW (ref 4.2–5.8)
RBC # FLD: 13.6 % — SIGNIFICANT CHANGE UP (ref 10.3–14.5)
SODIUM SERPL-SCNC: 140 MMOL/L — SIGNIFICANT CHANGE UP (ref 135–145)
WBC # BLD: 12.23 K/UL — HIGH (ref 3.8–10.5)
WBC # FLD AUTO: 12.23 K/UL — HIGH (ref 3.8–10.5)

## 2024-09-22 RX ORDER — ACETAMINOPHEN 325 MG
1000 TABLET ORAL ONCE
Refills: 0 | Status: COMPLETED | OUTPATIENT
Start: 2024-09-22 | End: 2024-09-23

## 2024-09-22 RX ORDER — ALPRAZOLAM 0.5 MG/1
0.25 TABLET ORAL ONCE
Refills: 0 | Status: DISCONTINUED | OUTPATIENT
Start: 2024-09-22 | End: 2024-09-22

## 2024-09-22 RX ORDER — POTASSIUM PHOSPHATE, MONOBASIC POTASSIUM PHOSPHATE, DIBASIC 224; 236 MG/ML; MG/ML
30 INJECTION, SOLUTION, CONCENTRATE INTRAVENOUS ONCE
Refills: 0 | Status: COMPLETED | OUTPATIENT
Start: 2024-09-22 | End: 2024-09-22

## 2024-09-22 RX ORDER — ACETAMINOPHEN 325 MG
1000 TABLET ORAL ONCE
Refills: 0 | Status: COMPLETED | OUTPATIENT
Start: 2024-09-22 | End: 2024-09-22

## 2024-09-22 RX ADMIN — ALPRAZOLAM 0.25 MILLIGRAM(S): 0.5 TABLET ORAL at 13:58

## 2024-09-22 RX ADMIN — Medication 1 PATCH: at 22:50

## 2024-09-22 RX ADMIN — Medication 1 PATCH: at 11:32

## 2024-09-22 RX ADMIN — Medication 400 MILLIGRAM(S): at 14:11

## 2024-09-22 RX ADMIN — IPRATROPIUM BROMIDE AND ALBUTEROL SULFATE 3 MILLILITER(S): .5; 3 SOLUTION RESPIRATORY (INHALATION) at 23:06

## 2024-09-22 RX ADMIN — Medication 1 PATCH: at 07:28

## 2024-09-22 RX ADMIN — IPRATROPIUM BROMIDE AND ALBUTEROL SULFATE 3 MILLILITER(S): .5; 3 SOLUTION RESPIRATORY (INHALATION) at 17:07

## 2024-09-22 RX ADMIN — Medication 1 PATCH: at 11:26

## 2024-09-22 RX ADMIN — PIPERACILLIN SODIUM AND TAZOBACTAM SODIUM 25 GRAM(S): 12; 1.5 INJECTION, POWDER, LYOPHILIZED, FOR SOLUTION INTRAVENOUS at 22:48

## 2024-09-22 RX ADMIN — Medication 5000 UNIT(S): at 14:15

## 2024-09-22 RX ADMIN — POTASSIUM PHOSPHATE, MONOBASIC POTASSIUM PHOSPHATE, DIBASIC 83.33 MILLIMOLE(S): 224; 236 INJECTION, SOLUTION, CONCENTRATE INTRAVENOUS at 11:26

## 2024-09-22 RX ADMIN — Medication 1000 MILLIGRAM(S): at 15:51

## 2024-09-22 RX ADMIN — Medication 5000 UNIT(S): at 05:59

## 2024-09-22 RX ADMIN — IPRATROPIUM BROMIDE AND ALBUTEROL SULFATE 3 MILLILITER(S): .5; 3 SOLUTION RESPIRATORY (INHALATION) at 11:07

## 2024-09-22 RX ADMIN — PIPERACILLIN SODIUM AND TAZOBACTAM SODIUM 25 GRAM(S): 12; 1.5 INJECTION, POWDER, LYOPHILIZED, FOR SOLUTION INTRAVENOUS at 14:14

## 2024-09-22 RX ADMIN — OXYCODONE HYDROCHLORIDE 5 MILLIGRAM(S): 30 TABLET, FILM COATED, EXTENDED RELEASE ORAL at 06:11

## 2024-09-22 RX ADMIN — CHLORHEXIDINE GLUCONATE ORAL RINSE 1 APPLICATION(S): 1.2 SOLUTION DENTAL at 06:18

## 2024-09-22 RX ADMIN — PIPERACILLIN SODIUM AND TAZOBACTAM SODIUM 25 GRAM(S): 12; 1.5 INJECTION, POWDER, LYOPHILIZED, FOR SOLUTION INTRAVENOUS at 05:59

## 2024-09-22 RX ADMIN — Medication 5000 UNIT(S): at 22:48

## 2024-09-22 NOTE — PROGRESS NOTE ADULT - SUBJECTIVE AND OBJECTIVE BOX
SURGERY PROGRESS HPI:  Pt seen and examined at bedside. No acute events overnight. Pt tolerating diet. +Ostomy function.      Vital Signs Last 24 Hrs  T(C): 37.2 (22 Sep 2024 04:57), Max: 37.6 (21 Sep 2024 12:00)  T(F): 98.9 (22 Sep 2024 04:57), Max: 99.6 (21 Sep 2024 12:00)  HR: 61 (22 Sep 2024 04:57) (61 - 92)  BP: 114/77 (22 Sep 2024 04:57) (99/75 - 121/93)  BP(mean): 84 (22 Sep 2024 01:22) (77 - 99)  RR: 19 (22 Sep 2024 04:57) (12 - 27)  SpO2: 95% (22 Sep 2024 04:57) (92% - 98%)    Parameters below as of 22 Sep 2024 04:57  Patient On (Oxygen Delivery Method): nasal cannula  O2 Flow (L/min): 2        PHYSICAL EXAM:    GENERAL: NAD  HEAD:  Atraumatic, Normocephalic  CHEST/LUNG: breathing normally  HEART: RRR  ABDOMEN: non distended, soft, appropriately tender, no guarding.  midline incision packing CDI, drain with 30cc s/s output, ostomy brown in color, functioning, stool and air in bag  : andre with clear urine output 1700cc overnight

## 2024-09-22 NOTE — PROGRESS NOTE ADULT - NS ATTEND AMEND GEN_ALL_CORE FT
Patient seen and examined  Doing well  No nausea, vomiting, fever or chills  Tolerated some regular diet    Awake, alert and oriented  Breathing comfortably on room air  Abd is soft, not distended, appropriately tender  Ostomy is viable, with output  ESAU drain serosanguinous  Andre with clear urine    - regular diet as tolerated  - ambulate as tolerated  - continue to monitor ESAU quality and quantity  - keep andre

## 2024-09-23 LAB
ANION GAP SERPL CALC-SCNC: 5 MMOL/L — SIGNIFICANT CHANGE UP (ref 5–17)
BASOPHILS # BLD AUTO: 0.06 K/UL — SIGNIFICANT CHANGE UP (ref 0–0.2)
BASOPHILS NFR BLD AUTO: 0.5 % — SIGNIFICANT CHANGE UP (ref 0–2)
BUN SERPL-MCNC: 9 MG/DL — SIGNIFICANT CHANGE UP (ref 7–23)
CALCIUM SERPL-MCNC: 8.7 MG/DL — SIGNIFICANT CHANGE UP (ref 8.5–10.1)
CHLORIDE SERPL-SCNC: 109 MMOL/L — HIGH (ref 96–108)
CO2 SERPL-SCNC: 27 MMOL/L — SIGNIFICANT CHANGE UP (ref 22–31)
CREAT SERPL-MCNC: 0.8 MG/DL — SIGNIFICANT CHANGE UP (ref 0.5–1.3)
EGFR: 108 ML/MIN/1.73M2 — SIGNIFICANT CHANGE UP
EOSINOPHIL # BLD AUTO: 0.24 K/UL — SIGNIFICANT CHANGE UP (ref 0–0.5)
EOSINOPHIL NFR BLD AUTO: 2.1 % — SIGNIFICANT CHANGE UP (ref 0–6)
GLUCOSE SERPL-MCNC: 91 MG/DL — SIGNIFICANT CHANGE UP (ref 70–99)
HCT VFR BLD CALC: 35.4 % — LOW (ref 39–50)
HGB BLD-MCNC: 11.6 G/DL — LOW (ref 13–17)
IMM GRANULOCYTES NFR BLD AUTO: 0.4 % — SIGNIFICANT CHANGE UP (ref 0–0.9)
LYMPHOCYTES # BLD AUTO: 2.47 K/UL — SIGNIFICANT CHANGE UP (ref 1–3.3)
LYMPHOCYTES # BLD AUTO: 21.9 % — SIGNIFICANT CHANGE UP (ref 13–44)
MAGNESIUM SERPL-MCNC: 1.9 MG/DL — SIGNIFICANT CHANGE UP (ref 1.6–2.6)
MCHC RBC-ENTMCNC: 31.4 PG — SIGNIFICANT CHANGE UP (ref 27–34)
MCHC RBC-ENTMCNC: 32.8 G/DL — SIGNIFICANT CHANGE UP (ref 32–36)
MCV RBC AUTO: 95.7 FL — SIGNIFICANT CHANGE UP (ref 80–100)
MONOCYTES # BLD AUTO: 1.03 K/UL — HIGH (ref 0–0.9)
MONOCYTES NFR BLD AUTO: 9.1 % — SIGNIFICANT CHANGE UP (ref 2–14)
NEUTROPHILS # BLD AUTO: 7.43 K/UL — HIGH (ref 1.8–7.4)
NEUTROPHILS NFR BLD AUTO: 66 % — SIGNIFICANT CHANGE UP (ref 43–77)
NRBC # BLD: 0 /100 WBCS — SIGNIFICANT CHANGE UP (ref 0–0)
PHOSPHATE SERPL-MCNC: 3.5 MG/DL — SIGNIFICANT CHANGE UP (ref 2.5–4.5)
PLATELET # BLD AUTO: 340 K/UL — SIGNIFICANT CHANGE UP (ref 150–400)
POTASSIUM SERPL-MCNC: 4.4 MMOL/L — SIGNIFICANT CHANGE UP (ref 3.5–5.3)
POTASSIUM SERPL-SCNC: 4.4 MMOL/L — SIGNIFICANT CHANGE UP (ref 3.5–5.3)
RBC # BLD: 3.7 M/UL — LOW (ref 4.2–5.8)
RBC # FLD: 13.6 % — SIGNIFICANT CHANGE UP (ref 10.3–14.5)
SODIUM SERPL-SCNC: 141 MMOL/L — SIGNIFICANT CHANGE UP (ref 135–145)
WBC # BLD: 11.27 K/UL — HIGH (ref 3.8–10.5)
WBC # FLD AUTO: 11.27 K/UL — HIGH (ref 3.8–10.5)

## 2024-09-23 PROCEDURE — 99222 1ST HOSP IP/OBS MODERATE 55: CPT

## 2024-09-23 RX ORDER — ACETAMINOPHEN 325 MG
1000 TABLET ORAL EVERY 6 HOURS
Refills: 0 | Status: DISCONTINUED | OUTPATIENT
Start: 2024-09-23 | End: 2024-09-24

## 2024-09-23 RX ORDER — ALPRAZOLAM 0.5 MG/1
0.5 TABLET ORAL THREE TIMES A DAY
Refills: 0 | Status: DISCONTINUED | OUTPATIENT
Start: 2024-09-23 | End: 2024-09-25

## 2024-09-23 RX ORDER — ALPRAZOLAM 0.5 MG/1
0.5 TABLET ORAL EVERY 12 HOURS
Refills: 0 | Status: DISCONTINUED | OUTPATIENT
Start: 2024-09-23 | End: 2024-09-23

## 2024-09-23 RX ADMIN — IPRATROPIUM BROMIDE AND ALBUTEROL SULFATE 3 MILLILITER(S): .5; 3 SOLUTION RESPIRATORY (INHALATION) at 11:09

## 2024-09-23 RX ADMIN — IPRATROPIUM BROMIDE AND ALBUTEROL SULFATE 3 MILLILITER(S): .5; 3 SOLUTION RESPIRATORY (INHALATION) at 06:07

## 2024-09-23 RX ADMIN — CHLORHEXIDINE GLUCONATE ORAL RINSE 1 APPLICATION(S): 1.2 SOLUTION DENTAL at 05:15

## 2024-09-23 RX ADMIN — Medication 400 MILLIGRAM(S): at 03:12

## 2024-09-23 RX ADMIN — Medication 1 PATCH: at 19:54

## 2024-09-23 RX ADMIN — Medication 5000 UNIT(S): at 21:32

## 2024-09-23 RX ADMIN — PIPERACILLIN SODIUM AND TAZOBACTAM SODIUM 25 GRAM(S): 12; 1.5 INJECTION, POWDER, LYOPHILIZED, FOR SOLUTION INTRAVENOUS at 05:08

## 2024-09-23 RX ADMIN — Medication 1 PATCH: at 11:00

## 2024-09-23 RX ADMIN — Medication 1000 MILLIGRAM(S): at 03:42

## 2024-09-23 RX ADMIN — Medication 5000 UNIT(S): at 14:48

## 2024-09-23 RX ADMIN — PIPERACILLIN SODIUM AND TAZOBACTAM SODIUM 25 GRAM(S): 12; 1.5 INJECTION, POWDER, LYOPHILIZED, FOR SOLUTION INTRAVENOUS at 14:47

## 2024-09-23 RX ADMIN — Medication 1 PATCH: at 11:39

## 2024-09-23 RX ADMIN — Medication 1 PATCH: at 07:35

## 2024-09-23 RX ADMIN — Medication 1000 MILLIGRAM(S): at 22:12

## 2024-09-23 RX ADMIN — PIPERACILLIN SODIUM AND TAZOBACTAM SODIUM 25 GRAM(S): 12; 1.5 INJECTION, POWDER, LYOPHILIZED, FOR SOLUTION INTRAVENOUS at 21:31

## 2024-09-23 RX ADMIN — Medication 400 MILLIGRAM(S): at 21:41

## 2024-09-23 RX ADMIN — Medication 5000 UNIT(S): at 05:10

## 2024-09-23 NOTE — BH CONSULTATION LIAISON ASSESSMENT NOTE - RISK ASSESSMENT
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 Protective factors: age < 65 yrs, no major medical issues, no chronic pain, medication and treatment compliant; no psychiatric history - no hospitalizations, no formal diagnosis; no suicide attempts; no self-injurious behavior; no hx of aggression/violence; no legal issues; motivated for help; articulate; strong family support; stable domicile, engaged in work. Acute risk factors identified - no current access to mental health services and interested in seeing a professional - mitigated by referrals for CBT; PCP/Internist can RX PRn xanax

## 2024-09-23 NOTE — PROGRESS NOTE ADULT - NS ATTEND AMEND GEN_ALL_CORE FT
POD4 Peters's, tolerating reg diet, ostomy w/ gas and stool. ESAU drain clear, andre remains clear output. PT, plan for cystogram tomorrow

## 2024-09-23 NOTE — BH CONSULTATION LIAISON ASSESSMENT NOTE - NSBHCONSULTRECOMMENDOTHER_PSY_A_CORE FT
- increase nicotine patch to 14mcg (1 ppd smoker since teens) and bridge with PRN lozenges 9edentulous; cannot chew gum)  - can use PRN xanax 0.5mg PO up to TID for now; then reduce to BID on discharge (Pt and mother both confirmed anxiousness reduced on Xanax)

## 2024-09-23 NOTE — BH CONSULTATION LIAISON ASSESSMENT NOTE - SUMMARY
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 Best remedy will be intense CBT therapy with secondary medication management treatment - will take up to one year. extensive psychoeducation provided with patient and mother regarding the long term treatment for complaints will l be specific type of therapy which will require 1-2/week attendance including doing CBT exercises/homework and full commitment. Since it helped acute sxs, can use PRN xanax in the short term and only 'as needed."

## 2024-09-23 NOTE — BH CONSULTATION LIAISON ASSESSMENT NOTE - PATIENT'S CHIEF COMPLAINT
I met with patient and her family to complete new patient orientation and the NCCN distress screening; she indicated a rating of 6.  Patient is currently receiving counseling services from [facility].  I provided patient with the number to [facility]; they provide comprehensive services which include counseling and support groups over the phone, online, educational workshops, publications, and possible financial and co-payment assistance which are provided by oncology social workers and cancer experts.  Patient declined to sign consent for the [employer]. I provided patient with [facility]'s community events flyer and encouraged them to attend the support groups, especially meditation which helps reduce depression and anxiety.  I provided patient with a copy of the application for [facility] aged, blind and disabled programs to see if she will qualify for some assistance.  She stated that she plans to go to the [place] Medicaid office once she has completed the application provided.  I provided my contact information in the event they need supportive services in the future.       'I have always been jittery"

## 2024-09-23 NOTE — BH CONSULTATION LIAISON ASSESSMENT NOTE - OTHER
'I am my own worst enemy' deferred  high end of fair  faster rate (higher end of normal) but not pressured

## 2024-09-23 NOTE — BH CONSULTATION LIAISON ASSESSMENT NOTE - CURRENT MEDICATION
MEDICATIONS  (STANDING):  acetaminophen   IVPB .. 1000 milliGRAM(s) IV Intermittent once  albuterol/ipratropium for Nebulization 3 milliLiter(s) Nebulizer every 6 hours  chlorhexidine 2% Cloths 1 Application(s) Topical <User Schedule>  heparin   Injectable 5000 Unit(s) SubCutaneous every 8 hours  influenza   Vaccine 0.5 milliLiter(s) IntraMuscular once  nicotine -   7 mG/24Hr(s) Patch 1 Patch Transdermal daily  piperacillin/tazobactam IVPB.. 3.375 Gram(s) IV Intermittent every 8 hours    MEDICATIONS  (PRN):  ALPRAZolam 0.5 milliGRAM(s) Oral every 12 hours PRN anxiety  benzocaine/menthol Lozenge 1 Lozenge Oral every 3 hours PRN Sore Throat  HYDROmorphone  Injectable 1 milliGRAM(s) IV Push every 4 hours PRN Severe Pain (7 - 10)  ondansetron Injectable 4 milliGRAM(s) IV Push every 6 hours PRN Nausea and/or Vomiting  oxyCODONE    IR 5 milliGRAM(s) Oral every 6 hours PRN Moderate Pain (4 - 6)

## 2024-09-23 NOTE — BH CONSULTATION LIAISON ASSESSMENT NOTE - NSBHCHARTREVIEWINVESTIGATE_PSY_A_CORE FT
CT abdomen  Markedly narrowed segment of sigmoid colon shows marked wall thickening and associated mucosal hyperenhancement; it is the explicit   culprit for the large bowel obstruction. This is highly suspicious for a primary colonic neoplasm although, an ongoing infectious/inflammatory   process could have a similar appearance in the proper clinical scenario.

## 2024-09-23 NOTE — BH CONSULTATION LIAISON ASSESSMENT NOTE - HPI (INCLUDE ILLNESS QUALITY, SEVERITY, DURATION, TIMING, CONTEXT, MODIFYING FACTORS, ASSOCIATED SIGNS AND SYMPTOMS)
49yo M, with PMH of right epididymoorchitis in 04/24, current smoker, former alcohol misuse disorder (sober 11 years), admitted with large bowel obstruction   secondary to colovesical fistula s/p ex lap, take down of colovesical fistula, repair of urinary bladder, sigmoid resection with ostomy creation on 9/19/24, ostomy with stool output.     ISTOP Reference #: [id number] no record of Patient   CVM no record of Patient  47yo M, with PMH of right epididymoorchitis in 04/24, smokes 1 ppd x 30 years, former alcohol misuse disorder (sober 11 years), admitted with large bowel obstruction secondary to colovesical fistula s/p ex lap, take down of colovesical fistula, repair of urinary bladder, sigmoid resection with ostomy creation on 9/19/24, + ostomy, extubated on 9/19/24. downgraded from ICU on 9/21/24, consult called for anxiety/difficulty adjusting to ostomy.     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 , with [employer] known formal past psychiatric history apart from self-described 'always been a jittery person' with propensity to catastrophize/think the worst in all scenarios resulting in anxiousness and perseveration when thinking about 'worst case scenarios," brief low dose Zoloft trial (PCP prescribed; side effects of sedation so stopped taking it), smokes 1 ppd x 30 years, former alcohol misuse disorder (sober 11 years), with PMH of right epididymoorchitis in ,  admitted with large bowel obstruction secondary to colovesical fistula s/p ex lap, take down of colovesical fistula, repair of urinary bladder, sigmoid resection with ostomy creation on 24, + ostomy, extubated on 24. downgraded from ICU on 24, consult called for anxiety/difficulty adjusting to ostomy.     ISTOP Reference #: [id number] no record of Patient   CVM no record of Patient   SUNRISE: only one MRN    EXAM: calm, cooperative, polite, overinclusive/perseverative.  Provides examples of 'worst case scenario" thoughts such as thinking his wife ran away with another man when she was not answering her cell phone and ended up in a hospital with an aneurysm; if mother calls then assuming someone got hurt or  etc. Patient reports he has always had this way of thinking, but this year it became more intense to the point that it causes him distress and impacted his job performance, home life. 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 Smoking cigarettes helps reduce anxiety in the moment. Denies other substance use. Denies and does not manifest any symptoms of hypomania/flaco/psychosis/major depression; PRn xanax helped at [facility], says 'anxiety at bay now." Denies any active or passive suicidal or homicidal ideation. Names protective factors (nithya; family; hope for future). denies use of illicit substances. denies access to guns.     COLLATERAL FROM MOTHER OBED; confirms everything Pt said, as well as his history. She encouraged him to talk to a mental health professional as his anxiety/way of thinking has become more of an issue. no acute safety concerns. Patient has never seen a psychiatrist before.

## 2024-09-23 NOTE — PROGRESS NOTE ADULT - SUBJECTIVE AND OBJECTIVE BOX
Pt seen and examined at bedside with Dr. Danielle, pts mother also at bedside, discussed plan for Ct urogram in a couple days, also that he will recieve ostomy training and homecare on discharge.pt very anxious about his overall health and recovery.    T(F): 98.1 (09-23-24 @ 11:19), Max: 99.2 (09-22-24 @ 23:58)  HR: 85 (09-23-24 @ 12:27) (72 - 98)  BP: 137/87 (09-23-24 @ 11:19) (125/80 - 137/87)  RR: 24 (09-23-24 @ 11:19) (18 - 24)  SpO2: 97% (09-23-24 @ 11:19) (93% - 97%)  Wt(kg): --  CAPILLARY BLOOD GLUCOSE      PHYSICAL EXAM:    GENERAL: NAD  HEAD:  Atraumatic, Normocephalic  CHEST/LUNG: breathing normally  HEART: RRR  ABDOMEN: nondistended, soft, appropriately tender, no guarding.  midline dressing c/d/i.  drain with 30cc s/s output, ostomy brown in color, functioning, stool and air in bag  : andre with clear urine output 1775cc overnight    LABS:                        11.6   11.27 )-----------( 340      ( 23 Sep 2024 06:10 )             35.4     09-23    141  |  109[H]  |  9   ----------------------------<  91  4.4   |  27  |  0.80    Ca    8.7      23 Sep 2024 06:10  Phos  3.5     09-23  Mg     1.9     09-23    TPro  5.4[L]  /  Alb  2.1[L]  /  TBili  0.3  /  DBili  x   /  AST  39[H]  /  ALT  25  /  AlkPhos  68  09-22      I&O's Detail    22 Sep 2024 07:01  -  23 Sep 2024 07:00  --------------------------------------------------------  IN:  Total IN: 0 mL    OUT:    Bulb (mL): 25 mL    Colostomy (mL): 250 mL    Indwelling Catheter - Urethral (mL): 1775 mL  Total OUT: 2050 mL    Total NET: -2050 mL      23 Sep 2024 07:01  -  23 Sep 2024 13:15  --------------------------------------------------------  IN:    Oral Fluid: 320 mL  Total IN: 320 mL    OUT:    Bulb (mL): 25 mL    Colostomy (mL): 250 mL    Indwelling Catheter - Urethral (mL): 590 mL  Total OUT: 865 mL    Total NET: -545 mL        Culture Results:   Rare Escherichia coli *!* (09-19 @ 06:30)  Culture Results:   No growth at 4 days (09-19 @ 01:10)  Culture Results:   <10,000 CFU/mL Normal Urogenital Kendy (09-18 @ 23:40)  Culture Results:   No growth at 4 days (09-18 @ 23:30)      48M with PMH of anxiety, right epididymoorchitis in April, current smoker, former ETOH (sober 11 years), COPD presents with lower abdominal pain for a few days, found with 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   tolerating regular diet, +GI function  IV abx   tylenol ATC, narcotics prn   continue andre. do NOT remove andre.  CT cystogram planning on Wed  ostomy teaching   consult for anxiety  discussed with Dr. Danielle

## 2024-09-23 NOTE — BH CONSULTATION LIAISON ASSESSMENT NOTE - NSBHCHARTREVIEWVS_PSY_A_CORE FT
Vital Signs Last 24 Hrs  T(C): 36.7 (23 Sep 2024 11:19), Max: 37.3 (22 Sep 2024 23:58)  T(F): 98.1 (23 Sep 2024 11:19), Max: 99.2 (22 Sep 2024 23:58)  HR: 85 (23 Sep 2024 12:27) (72 - 98)  BP: 137/87 (23 Sep 2024 11:19) (125/80 - 137/87)  BP(mean): --  RR: 24 (23 Sep 2024 11:19) (18 - 24)  SpO2: 97% (23 Sep 2024 11:19) (93% - 97%)    Parameters below as of 23 Sep 2024 12:27  Patient On (Oxygen Delivery Method): room air

## 2024-09-23 NOTE — BH CONSULTATION LIAISON ASSESSMENT NOTE - NSBHCHARTREVIEWLAB_PSY_A_CORE FT
09-23    141  |  109[H]  |  9   ----------------------------<  91  4.4   |  27  |  0.80    Ca    8.7      23 Sep 2024 06:10  Phos  3.5     09-23  Mg     1.9     09-23    TPro  5.4[L]  /  Alb  2.1[L]  /  TBili  0.3  /  DBili  x   /  AST  39[H]  /  ALT  25  /  AlkPhos  68  09-22

## 2024-09-24 LAB
ANION GAP SERPL CALC-SCNC: 3 MMOL/L — LOW (ref 5–17)
BUN SERPL-MCNC: 9 MG/DL — SIGNIFICANT CHANGE UP (ref 7–23)
CALCIUM SERPL-MCNC: 8.9 MG/DL — SIGNIFICANT CHANGE UP (ref 8.5–10.1)
CHLORIDE SERPL-SCNC: 112 MMOL/L — HIGH (ref 96–108)
CO2 SERPL-SCNC: 26 MMOL/L — SIGNIFICANT CHANGE UP (ref 22–31)
CREAT SERPL-MCNC: 0.77 MG/DL — SIGNIFICANT CHANGE UP (ref 0.5–1.3)
CULTURE RESULTS: ABNORMAL
CULTURE RESULTS: SIGNIFICANT CHANGE UP
CULTURE RESULTS: SIGNIFICANT CHANGE UP
EGFR: 109 ML/MIN/1.73M2 — SIGNIFICANT CHANGE UP
GLUCOSE SERPL-MCNC: 93 MG/DL — SIGNIFICANT CHANGE UP (ref 70–99)
GRAM STN FLD: ABNORMAL
HCT VFR BLD CALC: 36.7 % — LOW (ref 39–50)
HGB BLD-MCNC: 12.2 G/DL — LOW (ref 13–17)
MAGNESIUM SERPL-MCNC: 2.1 MG/DL — SIGNIFICANT CHANGE UP (ref 1.6–2.6)
MCHC RBC-ENTMCNC: 31.3 PG — SIGNIFICANT CHANGE UP (ref 27–34)
MCHC RBC-ENTMCNC: 33.2 G/DL — SIGNIFICANT CHANGE UP (ref 32–36)
MCV RBC AUTO: 94.1 FL — SIGNIFICANT CHANGE UP (ref 80–100)
NRBC # BLD: 0 /100 WBCS — SIGNIFICANT CHANGE UP (ref 0–0)
ORGANISM # SPEC MICROSCOPIC CNT: ABNORMAL
ORGANISM # SPEC MICROSCOPIC CNT: SIGNIFICANT CHANGE UP
PHOSPHATE SERPL-MCNC: 3.7 MG/DL — SIGNIFICANT CHANGE UP (ref 2.5–4.5)
PLATELET # BLD AUTO: 376 K/UL — SIGNIFICANT CHANGE UP (ref 150–400)
POTASSIUM SERPL-MCNC: 3.9 MMOL/L — SIGNIFICANT CHANGE UP (ref 3.5–5.3)
POTASSIUM SERPL-SCNC: 3.9 MMOL/L — SIGNIFICANT CHANGE UP (ref 3.5–5.3)
RBC # BLD: 3.9 M/UL — LOW (ref 4.2–5.8)
RBC # FLD: 13.8 % — SIGNIFICANT CHANGE UP (ref 10.3–14.5)
SODIUM SERPL-SCNC: 141 MMOL/L — SIGNIFICANT CHANGE UP (ref 135–145)
SPECIMEN SOURCE: SIGNIFICANT CHANGE UP
SURGICAL PATHOLOGY STUDY: SIGNIFICANT CHANGE UP
WBC # BLD: 9.92 K/UL — SIGNIFICANT CHANGE UP (ref 3.8–10.5)
WBC # FLD AUTO: 9.92 K/UL — SIGNIFICANT CHANGE UP (ref 3.8–10.5)

## 2024-09-24 PROCEDURE — 99231 SBSQ HOSP IP/OBS SF/LOW 25: CPT

## 2024-09-24 PROCEDURE — 74177 CT ABD & PELVIS W/CONTRAST: CPT | Mod: 26

## 2024-09-24 RX ORDER — OXYCODONE HYDROCHLORIDE 30 MG/1
10 TABLET, FILM COATED, EXTENDED RELEASE ORAL EVERY 6 HOURS
Refills: 0 | Status: DISCONTINUED | OUTPATIENT
Start: 2024-09-24 | End: 2024-09-28

## 2024-09-24 RX ORDER — ACETAMINOPHEN 325 MG
975 TABLET ORAL EVERY 6 HOURS
Refills: 0 | Status: DISCONTINUED | OUTPATIENT
Start: 2024-09-24 | End: 2024-10-01

## 2024-09-24 RX ADMIN — Medication 1 PATCH: at 14:07

## 2024-09-24 RX ADMIN — PIPERACILLIN SODIUM AND TAZOBACTAM SODIUM 25 GRAM(S): 12; 1.5 INJECTION, POWDER, LYOPHILIZED, FOR SOLUTION INTRAVENOUS at 21:07

## 2024-09-24 RX ADMIN — Medication 1 PATCH: at 07:07

## 2024-09-24 RX ADMIN — Medication 400 MILLIGRAM(S): at 03:06

## 2024-09-24 RX ADMIN — Medication 1 PATCH: at 20:58

## 2024-09-24 RX ADMIN — Medication 975 MILLIGRAM(S): at 21:06

## 2024-09-24 RX ADMIN — Medication 975 MILLIGRAM(S): at 10:11

## 2024-09-24 RX ADMIN — CHLORHEXIDINE GLUCONATE ORAL RINSE 1 APPLICATION(S): 1.2 SOLUTION DENTAL at 05:51

## 2024-09-24 RX ADMIN — Medication 1000 MILLIGRAM(S): at 03:36

## 2024-09-24 RX ADMIN — Medication 5000 UNIT(S): at 05:51

## 2024-09-24 RX ADMIN — Medication 1 PATCH: at 11:00

## 2024-09-24 RX ADMIN — Medication 5000 UNIT(S): at 14:08

## 2024-09-24 RX ADMIN — Medication 975 MILLIGRAM(S): at 09:11

## 2024-09-24 RX ADMIN — PIPERACILLIN SODIUM AND TAZOBACTAM SODIUM 25 GRAM(S): 12; 1.5 INJECTION, POWDER, LYOPHILIZED, FOR SOLUTION INTRAVENOUS at 05:50

## 2024-09-24 RX ADMIN — Medication 975 MILLIGRAM(S): at 22:06

## 2024-09-24 RX ADMIN — PIPERACILLIN SODIUM AND TAZOBACTAM SODIUM 25 GRAM(S): 12; 1.5 INJECTION, POWDER, LYOPHILIZED, FOR SOLUTION INTRAVENOUS at 14:08

## 2024-09-24 RX ADMIN — Medication 5000 UNIT(S): at 21:08

## 2024-09-24 NOTE — BH CONSULTATION LIAISON PROGRESS NOTE - OTHER
'I am my own worst enemy' high end of fair  deferred  faster rate (higher end of normal) but not pressured

## 2024-09-24 NOTE — BH CONSULTATION LIAISON PROGRESS NOTE - NSBHFUPINTERVALHXFT_PSY_A_CORE
No significant interval events. Patient has not utilized any PRN Xanax since last seen.  No significant interval events. Patient has not utilized any PRN Xanax since last seen.  No complaints, remains calm, cooperative, medication and treatment compliant. Same psychiatric clinical presentation consistent with baseline.

## 2024-09-24 NOTE — BH CONSULTATION LIAISON PROGRESS NOTE - NSBHCONSULTRECOMMENDOTHER_PSY_A_CORE FT
9/23/24: increase nicotine patch to 14mcg (1 ppd smoker since teens) and bridge with PRN lozenges 9edentulous; cannot chew gum)  - can use PRN xanax 0.5mg PO up to TID for now; then reduce to BID on discharge (Pt and mother both confirmed anxiousness reduced on Xanax)  9/24/24:  9/23/24: increase nicotine patch to 14mcg (1 ppd smoker since teens) and bridge with PRN lozenges 9edentulous; cannot chew gum)  - can use PRN xanax 0.5mg PO up to TID for now; then reduce to BID on discharge (Pt and mother both confirmed anxiousness reduced on Xanax)  9/24/24: literature left with Patient to start reading about cognitive distortions and how to overcome them. Encouraged to start watching Cognitive Behavioral Therapy introductory videos online ([employer] titles given)   - has capacity to make his medical decisions and can leave AMA

## 2024-09-24 NOTE — PROGRESS NOTE ADULT - SUBJECTIVE AND OBJECTIVE BOX
Patient seen and examined at bedside resting comfortably.   Offers no complaints.   Tolerating diet, ostomy functioning. Andre catheter in place.   Denies fever, chills, N/V/D, CP, SOB.     Vital Signs Last 24 Hrs  T(F): 98.4 (09-24-24 @ 10:38), Max: 98.6 (09-23-24 @ 17:10)  HR: 88 (09-24-24 @ 10:38)  BP: 126/76 (09-24-24 @ 10:38)  RR: 17 (09-24-24 @ 10:38)  SpO2: 96% (09-24-24 @ 10:38)    PHYSICAL EXAM:  GENERAL: Alert, NAD  CHEST/LUNG: Clear to auscultation bilaterally, respirations nonlabored  HEART: Regular rate and rhythm; S1 & S2 appreciated  ABDOMEN: soft, NT/ND. Midline incision c/d/i. Ostomy pink and viable, air and brown stool in bag.   EXTREMITIES:  no calf tenderness, No edema    I&O's Detail    23 Sep 2024 07:01  -  24 Sep 2024 07:00  --------------------------------------------------------  IN:    Oral Fluid: 695 mL  Total IN: 695 mL    OUT:    Bulb (mL): 50 mL    Colostomy (mL): 251 mL    Indwelling Catheter - Urethral (mL): 3290 mL  Total OUT: 3591 mL    Total NET: -2896 mL      24 Sep 2024 07:01  -  24 Sep 2024 12:42  --------------------------------------------------------  IN:  Total IN: 0 mL    OUT:    Indwelling Catheter - Urethral (mL): 350 mL  Total OUT: 350 mL    Total NET: -350 mL    LABS:                        12.2   9.92  )-----------( 376      ( 24 Sep 2024 07:37 )             36.7     09-24    141  |  112[H]  |  9   ----------------------------<  93  3.9   |  26  |  0.77    Ca    8.9      24 Sep 2024 07:37  Phos  3.7     09-24  Mg     2.1     09-24    RADIOLOGY & ADDITIONAL STUDIES:    A/P  48M with PMH of anxiety, right epididymoorchitis in April, current smoker, former ETOH (sober 11 years), COPD presents with lower abdominal pain for a few days, found with 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   tolerating regular diet, +GI function  OR Cx: Ecoli  - continue iv abx   - analgesics prn   - regular diet  - f/u CT cystogram, continue andre until imaging results   - DVT ppx, OOB/AAT, IS  - appreciate  recs  - d/w Dr. Dsouza

## 2024-09-25 LAB
ANION GAP SERPL CALC-SCNC: 6 MMOL/L — SIGNIFICANT CHANGE UP (ref 5–17)
BUN SERPL-MCNC: 11 MG/DL — SIGNIFICANT CHANGE UP (ref 7–23)
CALCIUM SERPL-MCNC: 8.8 MG/DL — SIGNIFICANT CHANGE UP (ref 8.5–10.1)
CHLORIDE SERPL-SCNC: 111 MMOL/L — HIGH (ref 96–108)
CO2 SERPL-SCNC: 22 MMOL/L — SIGNIFICANT CHANGE UP (ref 22–31)
CREAT SERPL-MCNC: 0.82 MG/DL — SIGNIFICANT CHANGE UP (ref 0.5–1.3)
EGFR: 107 ML/MIN/1.73M2 — SIGNIFICANT CHANGE UP
GLUCOSE SERPL-MCNC: 89 MG/DL — SIGNIFICANT CHANGE UP (ref 70–99)
HCT VFR BLD CALC: 38.9 % — LOW (ref 39–50)
HGB BLD-MCNC: 12.9 G/DL — LOW (ref 13–17)
MAGNESIUM SERPL-MCNC: 2 MG/DL — SIGNIFICANT CHANGE UP (ref 1.6–2.6)
MCHC RBC-ENTMCNC: 31.5 PG — SIGNIFICANT CHANGE UP (ref 27–34)
MCHC RBC-ENTMCNC: 33.2 G/DL — SIGNIFICANT CHANGE UP (ref 32–36)
MCV RBC AUTO: 95.1 FL — SIGNIFICANT CHANGE UP (ref 80–100)
NRBC # BLD: 0 /100 WBCS — SIGNIFICANT CHANGE UP (ref 0–0)
PHOSPHATE SERPL-MCNC: 3.7 MG/DL — SIGNIFICANT CHANGE UP (ref 2.5–4.5)
PLATELET # BLD AUTO: 476 K/UL — HIGH (ref 150–400)
POTASSIUM SERPL-MCNC: 3.7 MMOL/L — SIGNIFICANT CHANGE UP (ref 3.5–5.3)
POTASSIUM SERPL-SCNC: 3.7 MMOL/L — SIGNIFICANT CHANGE UP (ref 3.5–5.3)
RBC # BLD: 4.09 M/UL — LOW (ref 4.2–5.8)
RBC # FLD: 13.9 % — SIGNIFICANT CHANGE UP (ref 10.3–14.5)
SODIUM SERPL-SCNC: 139 MMOL/L — SIGNIFICANT CHANGE UP (ref 135–145)
WBC # BLD: 11.91 K/UL — HIGH (ref 3.8–10.5)
WBC # FLD AUTO: 11.91 K/UL — HIGH (ref 3.8–10.5)

## 2024-09-25 PROCEDURE — 72192 CT PELVIS W/O DYE: CPT | Mod: 26

## 2024-09-25 PROCEDURE — 99231 SBSQ HOSP IP/OBS SF/LOW 25: CPT

## 2024-09-25 RX ORDER — ALPRAZOLAM 0.5 MG/1
0.5 TABLET ORAL DAILY
Refills: 0 | Status: DISCONTINUED | OUTPATIENT
Start: 2024-09-25 | End: 2024-10-01

## 2024-09-25 RX ADMIN — Medication 975 MILLIGRAM(S): at 03:47

## 2024-09-25 RX ADMIN — Medication 5000 UNIT(S): at 13:22

## 2024-09-25 RX ADMIN — Medication 975 MILLIGRAM(S): at 23:04

## 2024-09-25 RX ADMIN — PIPERACILLIN SODIUM AND TAZOBACTAM SODIUM 25 GRAM(S): 12; 1.5 INJECTION, POWDER, LYOPHILIZED, FOR SOLUTION INTRAVENOUS at 22:05

## 2024-09-25 RX ADMIN — Medication 975 MILLIGRAM(S): at 22:04

## 2024-09-25 RX ADMIN — PIPERACILLIN SODIUM AND TAZOBACTAM SODIUM 25 GRAM(S): 12; 1.5 INJECTION, POWDER, LYOPHILIZED, FOR SOLUTION INTRAVENOUS at 13:13

## 2024-09-25 RX ADMIN — Medication 5000 UNIT(S): at 22:05

## 2024-09-25 RX ADMIN — Medication 975 MILLIGRAM(S): at 04:46

## 2024-09-25 RX ADMIN — IPRATROPIUM BROMIDE AND ALBUTEROL SULFATE 3 MILLILITER(S): .5; 3 SOLUTION RESPIRATORY (INHALATION) at 11:21

## 2024-09-25 RX ADMIN — Medication 1 PATCH: at 13:54

## 2024-09-25 RX ADMIN — Medication 1 PATCH: at 08:00

## 2024-09-25 RX ADMIN — IPRATROPIUM BROMIDE AND ALBUTEROL SULFATE 3 MILLILITER(S): .5; 3 SOLUTION RESPIRATORY (INHALATION) at 17:23

## 2024-09-25 RX ADMIN — IPRATROPIUM BROMIDE AND ALBUTEROL SULFATE 3 MILLILITER(S): .5; 3 SOLUTION RESPIRATORY (INHALATION) at 00:13

## 2024-09-25 RX ADMIN — Medication 975 MILLIGRAM(S): at 15:06

## 2024-09-25 RX ADMIN — IPRATROPIUM BROMIDE AND ALBUTEROL SULFATE 3 MILLILITER(S): .5; 3 SOLUTION RESPIRATORY (INHALATION) at 05:22

## 2024-09-25 RX ADMIN — Medication 5000 UNIT(S): at 05:58

## 2024-09-25 RX ADMIN — Medication 975 MILLIGRAM(S): at 17:07

## 2024-09-25 RX ADMIN — Medication 1 PATCH: at 13:09

## 2024-09-25 RX ADMIN — PIPERACILLIN SODIUM AND TAZOBACTAM SODIUM 25 GRAM(S): 12; 1.5 INJECTION, POWDER, LYOPHILIZED, FOR SOLUTION INTRAVENOUS at 05:58

## 2024-09-25 RX ADMIN — IPRATROPIUM BROMIDE AND ALBUTEROL SULFATE 3 MILLILITER(S): .5; 3 SOLUTION RESPIRATORY (INHALATION) at 23:09

## 2024-09-25 RX ADMIN — Medication 1 PATCH: at 19:45

## 2024-09-25 RX ADMIN — Medication 975 MILLIGRAM(S): at 08:36

## 2024-09-25 RX ADMIN — CHLORHEXIDINE GLUCONATE ORAL RINSE 1 APPLICATION(S): 1.2 SOLUTION DENTAL at 05:58

## 2024-09-25 NOTE — PROGRESS NOTE ADULT - SUBJECTIVE AND OBJECTIVE BOX
SURGERY PROGRESS HPI:  Pt seen and examined at bedside. Pt appears comfortable, no acute events overnight      Vital Signs Last 24 Hrs  T(C): 36.8 (25 Sep 2024 04:33), Max: 36.9 (24 Sep 2024 10:38)  T(F): 98.2 (25 Sep 2024 04:33), Max: 98.4 (24 Sep 2024 10:38)  HR: 82 (25 Sep 2024 04:33) (80 - 100)  BP: 104/70 (25 Sep 2024 04:33) (104/70 - 165/95)  BP(mean): --  RR: 18 (25 Sep 2024 04:33) (17 - 18)  SpO2: 96% (25 Sep 2024 04:33) (96% - 97%)    Parameters below as of 25 Sep 2024 04:33  Patient On (Oxygen Delivery Method): room air          PHYSICAL EXAM:    GENERAL: NAD  HEAD:  Atraumatic, Normocephalic  CHEST/LUNG: breathing normally  HEART: RRR   ABDOMEN: non distended, soft, non tender, no guarding, midline incision CDI.  ostomy pink with air and stool in bag, drain serosang op  EXTREMITIES:  scd b/l

## 2024-09-25 NOTE — BH CONSULTATION LIAISON PROGRESS NOTE - NSBHFUPINTERVALHXFT_PSY_A_CORE
No significant interval events. Patient again has not utilized any PRN Xanax x 2 days now and reported that he did not need it. He has been trying to practice mindfulness, live in the moment and focus on one thing at a time which is his health and convalescence at this time. Received some frustrating conversation from family members which he tried not be annoyed by such as being told someone's toilet clogged and someone else needed a new light fixture in the dining room. No complaints otherwise, physically comfortable. Pt has remained calm, cooperative, medication and treatment compliant. Same psychiatric clinical presentation consistent with baseline.

## 2024-09-25 NOTE — BH CONSULTATION LIAISON PROGRESS NOTE - NSBHCONSULTRECOMMENDOTHER_PSY_A_CORE FT
9/23/24: increase nicotine patch to 14mcg (1 ppd smoker since teens) and bridge with PRN lozenges 9edentulous; cannot chew gum)  - can use PRN xanax 0.5mg PO up to TID for now; then reduce to BID on discharge (Pt and mother both confirmed anxiousness reduced on Xanax)  9/24/24: literature left with Patient to start reading about cognitive distortions and how to overcome them. Encouraged to start watching Cognitive Behavioral Therapy introductory videos online ([employer] titles given)   - has capacity to make his medical decisions and can leave AMA  9/25/24: reduce PRN Xanax to QD; has not used it in last 2 days   - encouraged Pt to watch a CBT informative video on [employer] this evening

## 2024-09-26 LAB
ANION GAP SERPL CALC-SCNC: 6 MMOL/L — SIGNIFICANT CHANGE UP (ref 5–17)
BUN SERPL-MCNC: 18 MG/DL — SIGNIFICANT CHANGE UP (ref 7–23)
CALCIUM SERPL-MCNC: 9.2 MG/DL — SIGNIFICANT CHANGE UP (ref 8.5–10.1)
CHLORIDE SERPL-SCNC: 113 MMOL/L — HIGH (ref 96–108)
CO2 SERPL-SCNC: 23 MMOL/L — SIGNIFICANT CHANGE UP (ref 22–31)
CREAT SERPL-MCNC: 0.89 MG/DL — SIGNIFICANT CHANGE UP (ref 0.5–1.3)
EGFR: 104 ML/MIN/1.73M2 — SIGNIFICANT CHANGE UP
GLUCOSE SERPL-MCNC: 96 MG/DL — SIGNIFICANT CHANGE UP (ref 70–99)
HCT VFR BLD CALC: 39.3 % — SIGNIFICANT CHANGE UP (ref 39–50)
HGB BLD-MCNC: 12.9 G/DL — LOW (ref 13–17)
MAGNESIUM SERPL-MCNC: 2.4 MG/DL — SIGNIFICANT CHANGE UP (ref 1.6–2.6)
MCHC RBC-ENTMCNC: 31.4 PG — SIGNIFICANT CHANGE UP (ref 27–34)
MCHC RBC-ENTMCNC: 32.8 G/DL — SIGNIFICANT CHANGE UP (ref 32–36)
MCV RBC AUTO: 95.6 FL — SIGNIFICANT CHANGE UP (ref 80–100)
NRBC # BLD: 0 /100 WBCS — SIGNIFICANT CHANGE UP (ref 0–0)
PHOSPHATE SERPL-MCNC: 4.2 MG/DL — SIGNIFICANT CHANGE UP (ref 2.5–4.5)
PLATELET # BLD AUTO: 549 K/UL — HIGH (ref 150–400)
POTASSIUM SERPL-MCNC: 3.9 MMOL/L — SIGNIFICANT CHANGE UP (ref 3.5–5.3)
POTASSIUM SERPL-SCNC: 3.9 MMOL/L — SIGNIFICANT CHANGE UP (ref 3.5–5.3)
RBC # BLD: 4.11 M/UL — LOW (ref 4.2–5.8)
RBC # FLD: 14.4 % — SIGNIFICANT CHANGE UP (ref 10.3–14.5)
SODIUM SERPL-SCNC: 142 MMOL/L — SIGNIFICANT CHANGE UP (ref 135–145)
WBC # BLD: 11.89 K/UL — HIGH (ref 3.8–10.5)
WBC # FLD AUTO: 11.89 K/UL — HIGH (ref 3.8–10.5)

## 2024-09-26 RX ADMIN — Medication 5000 UNIT(S): at 13:10

## 2024-09-26 RX ADMIN — Medication 975 MILLIGRAM(S): at 22:55

## 2024-09-26 RX ADMIN — Medication 1 PATCH: at 08:02

## 2024-09-26 RX ADMIN — Medication 975 MILLIGRAM(S): at 21:55

## 2024-09-26 RX ADMIN — IPRATROPIUM BROMIDE AND ALBUTEROL SULFATE 3 MILLILITER(S): .5; 3 SOLUTION RESPIRATORY (INHALATION) at 18:08

## 2024-09-26 RX ADMIN — IPRATROPIUM BROMIDE AND ALBUTEROL SULFATE 3 MILLILITER(S): .5; 3 SOLUTION RESPIRATORY (INHALATION) at 23:08

## 2024-09-26 RX ADMIN — Medication 1 PATCH: at 13:10

## 2024-09-26 RX ADMIN — Medication 975 MILLIGRAM(S): at 15:13

## 2024-09-26 RX ADMIN — Medication 975 MILLIGRAM(S): at 09:05

## 2024-09-26 RX ADMIN — Medication 975 MILLIGRAM(S): at 11:46

## 2024-09-26 RX ADMIN — Medication 975 MILLIGRAM(S): at 14:14

## 2024-09-26 RX ADMIN — CHLORHEXIDINE GLUCONATE ORAL RINSE 1 APPLICATION(S): 1.2 SOLUTION DENTAL at 05:31

## 2024-09-26 RX ADMIN — Medication 5000 UNIT(S): at 21:55

## 2024-09-26 RX ADMIN — Medication 5000 UNIT(S): at 05:31

## 2024-09-26 RX ADMIN — IPRATROPIUM BROMIDE AND ALBUTEROL SULFATE 3 MILLILITER(S): .5; 3 SOLUTION RESPIRATORY (INHALATION) at 11:15

## 2024-09-26 RX ADMIN — Medication 1 PATCH: at 13:35

## 2024-09-26 RX ADMIN — PIPERACILLIN SODIUM AND TAZOBACTAM SODIUM 25 GRAM(S): 12; 1.5 INJECTION, POWDER, LYOPHILIZED, FOR SOLUTION INTRAVENOUS at 05:31

## 2024-09-26 RX ADMIN — Medication 1 PATCH: at 19:51

## 2024-09-26 NOTE — PROGRESS NOTE ADULT - NS ATTEND AMEND GEN_ALL_CORE FT
Pt w/ ostomy function, tolerating regular diet, mildly distended on exam, WBC uptrending. Will obtain CTAP Pt w/ ostomy function, tolerating regular diet, Cysto w/o leak. Will dc thai, PT, f/u Abx recs, dispo plan

## 2024-09-26 NOTE — PROGRESS NOTE ADULT - SUBJECTIVE AND OBJECTIVE BOX
SURGERY PROGRESS HPI:  POD#7  Pt seen and examined at bedside. Pain is well controlled on pain medication. Pt denies complaints. Pt tolerating regular diet. Pt denies nausea and vomiting. +stool and air in ostomy bag. Pt denies chest pain, SOB, dizziness, fever, chills.    Vital Signs Last 24 Hrs  T(C): 36.5 (26 Sep 2024 05:39), Max: 37.1 (25 Sep 2024 11:23)  T(F): 97.7 (26 Sep 2024 05:39), Max: 98.8 (25 Sep 2024 11:23)  HR: 71 (26 Sep 2024 05:39) (71 - 100)  BP: 123/74 (26 Sep 2024 05:39) (104/71 - 123/84)  BP(mean): --  RR: 19 (26 Sep 2024 05:39) (18 - 19)  SpO2: 98% (26 Sep 2024 05:39) (95% - 100%)    Parameters below as of 26 Sep 2024 05:39  Patient On (Oxygen Delivery Method): room air      PHYSICAL EXAM:  CONSTITUTIONAL: NAD  HEENT: Atraumatic, Normocephalic  RESPIRATORY: Clear to ausculation, bilaterally   CARDIOVASCULAR: RRR S1S2  GASTROINTESTINAL: Ostomy dark with stool/air in the bag. Moses with serosanguinous output. Dressing and wound clean/dry/intact. non distended, +BS, soft, appropriate incisional tenderness  : Andre indwelling with clear yellow urine output  MUSCULOSKELETAL: calf soft, non tender b/l    I&O's Detail    24 Sep 2024 07:01  -  25 Sep 2024 07:00  --------------------------------------------------------  IN:  Total IN: 0 mL    OUT:    Bulb (mL): 15 mL    Colostomy (mL): 150 mL    Indwelling Catheter - Urethral (mL): 1650 mL  Total OUT: 1815 mL    Total NET: -1815 mL      25 Sep 2024 07:01  -  26 Sep 2024 06:13  --------------------------------------------------------  IN:    IV PiggyBack: 200 mL  Total IN: 200 mL    OUT:    Bulb (mL): 45 mL    Colostomy (mL): 400 mL    Indwelling Catheter - Urethral (mL): 900 mL  Total OUT: 1345 mL    Total NET: -1145 mL          LABS:                        12.9   11.91 )-----------( 476      ( 25 Sep 2024 06:10 )             38.9     09-25    139  |  111[H]  |  11  ----------------------------<  89  3.7   |  22  |  0.82    Ca    8.8      25 Sep 2024 06:10  Phos  3.7     09-25  Mg     2.0     09-25      Urinalysis Basic - ( 25 Sep 2024 06:10 )    Color: x / Appearance: x / SG: x / pH: x  Gluc: 89 mg/dL / Ketone: x  / Bili: x / Urobili: x   Blood: x / Protein: x / Nitrite: x   Leuk Esterase: x / RBC: x / WBC x   Sq Epi: x / Non Sq Epi: x / Bacteria: x    < from: CT Pelvis No Cont (09.25.24 @ 12:16) >    FINDINGS:  BLADDER: Andre catheter in place. Redemonstration of an intramural   collection at the bladder dome measuring 3.8 x 2.0 x 1.0 cm, which is   closely associated with the colonic stump (105:46). No leak or   extravasation identified.  REPRODUCTIVE ORGANS: Prostate gland is within normal limits. Few coarse   calcifications.    LYMPH NODES: Subcentimeter pelvic nodes.    VISUALIZED PORTIONS:  ABDOMINAL ORGANS: Within normal limits.  BOWEL: Left lower quadrant colostomy. Wall thickening involving   visualized colon as well as portion of the colon at the colostomy.  PERITONEUM/RETROPERITONEUM: Trace free fluid pelvis. Surgical drain   terminates in the left lower quadrant.  VESSELS: Atherosclerotic changes.  ABDOMINAL WALL: Within normal limits.  BONES: Within normal limits.    IMPRESSION:    Redemonstration of an intramural collection at the bladder dome measuring   3.8 x 2.0 x 1.0 cm, which is closely associated with the colonic stump.   Residual fistulous tract is not excluded.    No bladder leak or extravasation identified.    < end of copied text >        A/P: 49 yo M with PMH of  anixety, right epididymoorchitis in April, current smoker, former ETOH (sober 11 years), COPD presents with lower abdominal pain for a few days, found with 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 OR Cx: Ecoli. CT 9/25: No bladder leak or extravasation identified.  - IV abx   - Continue diet  - recommend tylenol ATC, narcotics prn   - continue andre. do NOT remove andre.  - drain care, monitor output.   - local wound care per surgery team   - DVT ppx, GI ppx

## 2024-09-26 NOTE — CHART NOTE - NSCHARTNOTEFT_GEN_A_CORE
Pt presented c abdominal pain, loose bowel movements x a few days PTA & episodes of vomiting; found c LBO; now s.p ex lap, take down of colovesical fistula, repair of urinary bladder, sigmoid re-sections c ostomy (9/19). Pt on surgery service; surgery team responsible for diet advancement; pt on regular diet & is tolerating so no change necessary at this time. We discussed low fiber food options in case pt finds he can no longer tolerate or experiences any gastric distress.     Factors impacting intake: [ X] none [ ] nausea  [ ] vomiting [ ] diarrhea [ ] constipation  [ ]chewing problems [ ] swallowing issues  [ ] other:     Diet Prescription: Diet, Regular (09-21-24 @ 16:31)    Intake:   pt is eating well; consuming % most meals    Current Weight:  85.7 kg at admission (9/19); 83.2 kg (9/20); & 81.2 kg (9/21)   % Weight Change:  5.3% st loss x 2 days?  questionable accuracy    no edema noted    Pertinent Medications: MEDICATIONS  (STANDING):  acetaminophen     Tablet .. 975 milliGRAM(s) Oral every 6 hours  albuterol/ipratropium for Nebulization 3 milliLiter(s) Nebulizer every 6 hours  chlorhexidine 2% Cloths 1 Application(s) Topical <User Schedule>  heparin   Injectable 5000 Unit(s) SubCutaneous every 8 hours  influenza   Vaccine 0.5 milliLiter(s) IntraMuscular once  nicotine -  14 mG/24Hr(s) Patch 1 Patch Transdermal daily    MEDICATIONS  (PRN):  ALPRAZolam 0.5 milliGRAM(s) Oral daily PRN anxiety  benzocaine/menthol Lozenge 1 Lozenge Oral every 3 hours PRN Sore Throat  nicotine  Polacrilex Lozenge 2 milliGRAM(s) Oral every 3 hours PRN nicotine dependence  ondansetron Injectable 4 milliGRAM(s) IV Push every 6 hours PRN Nausea and/or Vomiting  oxyCODONE    IR 5 milliGRAM(s) Oral every 6 hours PRN Moderate Pain (4 - 6)  oxyCODONE    IR 10 milliGRAM(s) Oral every 6 hours PRN Severe Pain (7 - 10)    Pertinent Labs: 09-26 Na142 mmol/L Glu 96 mg/dL K+ 3.9 mmol/L Cr  0.89 mg/dL BUN 18 mg/dL 09-26 Phos 4.2 mg/dL 09-22 Alb 2.1 g/dL[L]    Skin:   no pressure ulcers noted    Estimated Needs:   [X ] no change since previous assessment (9/20)  [ ] recalculated:     Previous Nutrition Diagnosis:   [X ] Inadequate Energy Intake  Etiology:  acute illness; LBO s/p [id number] c ostomy  Signs & Symptoms:  <50% nutrition needs >5 days    Nutrition Diagnosis is [ ] ongoing  [ X] resolved [ ] not applicable     New Nutrition Diagnosis: [X ] not applicable - NONE      Interventions:   continue current diet rx per surgery team  Recommend  [ ] Change Diet To:  [ ] Nutrition Supplement  [ ] Nutrition Support  [ ] Other:     Monitoring and Evaluation:   [X ] PO intake [ x ] Tolerance to diet prescription [ x ] weights [ x ] labs[ x ] follow up per protocol  [ ] other:

## 2024-09-27 LAB
ANION GAP SERPL CALC-SCNC: 6 MMOL/L — SIGNIFICANT CHANGE UP (ref 5–17)
BUN SERPL-MCNC: 13 MG/DL — SIGNIFICANT CHANGE UP (ref 7–23)
CALCIUM SERPL-MCNC: 9.4 MG/DL — SIGNIFICANT CHANGE UP (ref 8.5–10.1)
CHLORIDE SERPL-SCNC: 110 MMOL/L — HIGH (ref 96–108)
CO2 SERPL-SCNC: 23 MMOL/L — SIGNIFICANT CHANGE UP (ref 22–31)
CREAT SERPL-MCNC: 0.93 MG/DL — SIGNIFICANT CHANGE UP (ref 0.5–1.3)
EGFR: 100 ML/MIN/1.73M2 — SIGNIFICANT CHANGE UP
GLUCOSE SERPL-MCNC: 84 MG/DL — SIGNIFICANT CHANGE UP (ref 70–99)
HCT VFR BLD CALC: 41.9 % — SIGNIFICANT CHANGE UP (ref 39–50)
HGB BLD-MCNC: 13.3 G/DL — SIGNIFICANT CHANGE UP (ref 13–17)
MAGNESIUM SERPL-MCNC: 2.2 MG/DL — SIGNIFICANT CHANGE UP (ref 1.6–2.6)
MCHC RBC-ENTMCNC: 31 PG — SIGNIFICANT CHANGE UP (ref 27–34)
MCHC RBC-ENTMCNC: 31.7 G/DL — LOW (ref 32–36)
MCV RBC AUTO: 97.7 FL — SIGNIFICANT CHANGE UP (ref 80–100)
NRBC # BLD: 0 /100 WBCS — SIGNIFICANT CHANGE UP (ref 0–0)
PHOSPHATE SERPL-MCNC: 3.5 MG/DL — SIGNIFICANT CHANGE UP (ref 2.5–4.5)
PLATELET # BLD AUTO: 454 K/UL — HIGH (ref 150–400)
POTASSIUM SERPL-MCNC: 4.1 MMOL/L — SIGNIFICANT CHANGE UP (ref 3.5–5.3)
POTASSIUM SERPL-SCNC: 4.1 MMOL/L — SIGNIFICANT CHANGE UP (ref 3.5–5.3)
RBC # BLD: 4.29 M/UL — SIGNIFICANT CHANGE UP (ref 4.2–5.8)
RBC # FLD: 14.6 % — HIGH (ref 10.3–14.5)
SODIUM SERPL-SCNC: 139 MMOL/L — SIGNIFICANT CHANGE UP (ref 135–145)
WBC # BLD: 12.38 K/UL — HIGH (ref 3.8–10.5)
WBC # FLD AUTO: 12.38 K/UL — HIGH (ref 3.8–10.5)

## 2024-09-27 PROCEDURE — 99231 SBSQ HOSP IP/OBS SF/LOW 25: CPT

## 2024-09-27 RX ORDER — PIPERACILLIN SODIUM AND TAZOBACTAM SODIUM 12; 1.5 G/60ML; G/60ML
3.38 INJECTION, POWDER, LYOPHILIZED, FOR SOLUTION INTRAVENOUS EVERY 8 HOURS
Refills: 0 | Status: DISCONTINUED | OUTPATIENT
Start: 2024-09-27 | End: 2024-09-28

## 2024-09-27 RX ADMIN — Medication 975 MILLIGRAM(S): at 16:00

## 2024-09-27 RX ADMIN — Medication 975 MILLIGRAM(S): at 10:56

## 2024-09-27 RX ADMIN — Medication 975 MILLIGRAM(S): at 09:56

## 2024-09-27 RX ADMIN — PIPERACILLIN SODIUM AND TAZOBACTAM SODIUM 25 GRAM(S): 12; 1.5 INJECTION, POWDER, LYOPHILIZED, FOR SOLUTION INTRAVENOUS at 22:21

## 2024-09-27 RX ADMIN — Medication 975 MILLIGRAM(S): at 22:00

## 2024-09-27 RX ADMIN — IPRATROPIUM BROMIDE AND ALBUTEROL SULFATE 3 MILLILITER(S): .5; 3 SOLUTION RESPIRATORY (INHALATION) at 05:18

## 2024-09-27 RX ADMIN — Medication 1 PATCH: at 11:16

## 2024-09-27 RX ADMIN — Medication 1 PATCH: at 11:13

## 2024-09-27 RX ADMIN — Medication 5000 UNIT(S): at 22:11

## 2024-09-27 RX ADMIN — Medication 1 PATCH: at 21:33

## 2024-09-27 RX ADMIN — Medication 975 MILLIGRAM(S): at 15:03

## 2024-09-27 RX ADMIN — Medication 5000 UNIT(S): at 05:46

## 2024-09-27 RX ADMIN — CHLORHEXIDINE GLUCONATE ORAL RINSE 1 APPLICATION(S): 1.2 SOLUTION DENTAL at 05:47

## 2024-09-27 RX ADMIN — Medication 975 MILLIGRAM(S): at 23:00

## 2024-09-27 RX ADMIN — Medication 5000 UNIT(S): at 15:03

## 2024-09-27 NOTE — BH CONSULTATION LIAISON PROGRESS NOTE - NSBHASSESSMENTFT_PSY_ALL_CORE
to assess swallow function
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 Best remedy will be intense CBT therapy with secondary medication management treatment - will take up to one year. extensive psychoeducation provided with patient and mother regarding the long term treatment for complaints will l be specific type of therapy which will require 1-2/week attendance including doing CBT exercises/homework and full commitment. Since it helped acute sxs, can use PRN xanax in the short term and only 'as needed."

## 2024-09-27 NOTE — DISCHARGE NOTE PROVIDER - NSDCCPCAREPLAN_GEN_ALL_CORE_FT
PRINCIPAL DISCHARGE DIAGNOSIS  Diagnosis: Colovesical fistula  Assessment and Plan of Treatment:       SECONDARY DISCHARGE DIAGNOSES  Diagnosis: Bilateral lower abdominal pain  Assessment and Plan of Treatment:     Diagnosis: Left testicular pain  Assessment and Plan of Treatment:     Diagnosis: Small bowel obstruction  Assessment and Plan of Treatment:

## 2024-09-27 NOTE — PROGRESS NOTE ADULT - SUBJECTIVE AND OBJECTIVE BOX
Postoperative Day #: 8  Patient seen and examined bedside resting comfortably.  Patient reports feeling anxious  Abdominal pain is well controlled.  Denies nausea and vomiting. Tolerating diet.  Denies chest pain, dyspnea, cough.    T(F): 97.8 (09-27-24 @ 04:45), Max: 98.6 (09-26-24 @ 16:54)  HR: 84 (09-27-24 @ 06:11) (62 - 801)  BP: 116/67 (09-27-24 @ 04:45) (103/75 - 116/67)  RR: 18 (09-27-24 @ 04:45) (18 - 18)  SpO2: 96% (09-27-24 @ 06:11) (95% - 98%)  Wt(kg): --  CAPILLARY BLOOD GLUCOSE          PHYSICAL EXAM:  General: NAD  Neuro:  Alert & oriented x 3  HEENT: NCAT, EOMI, conjunctiva clear  CV: +S1+S2 regular rate and rhythm  Lung: clear to auscultation bilaterally   Abdomen: soft, NTND. Midline incision with gauze dressing in place, c/d/i. Ostomy pink and viable, with gas and stool in bag. Moses drain in place with serosanguinous output  Extremities: no pedal edema or calf tenderness noted     LABS:                        13.3   12.38 )-----------( 454      ( 27 Sep 2024 05:56 )             41.9     09-27    139  |  110[H]  |  13  ----------------------------<  84  4.1   |  23  |  0.93    Ca    9.4      27 Sep 2024 05:56  Phos  4.2     09-26  Mg     2.4     09-26      I&O's Detail    26 Sep 2024 07:01  -  27 Sep 2024 07:00  --------------------------------------------------------  IN:    Oral Fluid: 480 mL  Total IN: 480 mL    OUT:    Bulb (mL): 45 mL    Indwelling Catheter - Urethral (mL): 250 mL    Voided (mL): 375 mL  Total OUT: 670 mL    Total NET: -190 mL            Impression: 47 yo M with PMH of  anixety, right epididymoorchitis in April, current smoker, former ETOH (sober 11 years), COPD presents with lower abdominal pain for a few days, found with 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 OR Cx: Ecoli. CT 9/25: No bladder leak or extravasation identified.  Agosto catheter discontinued yesterday, PVR 17    Plan:  - Continue multimodal analgesia  - Continue xanax PRN  -continue VTE prophylaxis   -Increase activity with PT, OOB, Ambulate  -educated on proper incentive spirometry use  -continue local wound care per surgery team  -f/u AM labs

## 2024-09-27 NOTE — DISCHARGE NOTE PROVIDER - PROVIDER TOKENS
PROVIDER:[TOKEN:[[id number]],FOLLOWUP:[2 weeks]] PROVIDER:[TOKEN:[[id number]],FOLLOWUP:[1 week]]

## 2024-09-27 NOTE — PROGRESS NOTE ADULT - SUBJECTIVE AND OBJECTIVE BOX
SUBJECTIVE  JOSSELIN is a 47yo M with a PMHx anxiety, recent right epididymo-orchitis, current smoker, former ETOH use (sober 11 years) who initially presented to the ED w/ several days of b/l lower abdominal pain concerning for LBO. He is now POD8 s/p ex-lap, take down of colovesical fistula, repair of urinary bladder, sigmoid resection with ostomy creation.     Patient was seen and examined at bedside in AM, lying comfortably. No acute overnight events. Well-controlled abd pain with minimal discomfort. Tolerating solids, good urine output, ambulating, +flatus/+BM in ostomy bag. Denies N/V, fevers, chills.    OBJECTIVE     I&O's Detail    26 Sep 2024 07:01  -  27 Sep 2024 07:00  --------------------------------------------------------  IN:    Oral Fluid: 480 mL  Total IN: 480 mL    OUT:    Bulb (mL): 45 mL    Indwelling Catheter - Urethral (mL): 250 mL    Voided (mL): 375 mL  Total OUT: 670 mL    Total NET: -190 mL      27 Sep 2024 07:01  -  27 Sep 2024 13:24  --------------------------------------------------------  IN:    Oral Fluid: 721 mL  Total IN: 721 mL    OUT:  Total OUT: 0 mL    Total NET: 721 mL    Vital Signs Last 24 Hrs  T(C): 36.6 (27 Sep 2024 11:14), Max: 37 (26 Sep 2024 16:54)  T(F): 97.9 (27 Sep 2024 11:14), Max: 98.6 (26 Sep 2024 16:54)  HR: 95 (27 Sep 2024 11:14) (62 - 801)  BP: 112/74 (27 Sep 2024 11:14) (105/69 - 116/67)  BP(mean): 81 (26 Sep 2024 23:09) (81 - 81)  RR: 18 (27 Sep 2024 11:14) (18 - 18)  SpO2: 97% (27 Sep 2024 11:14) (95% - 98%)      PHYSICAL EXAM:  General: well-appearing, NAD  Neuro:  A&O x3  HEENT: NCAT, EOMI, conjunctiva clear  CV: Normal S1, S2, RRR  Lung: CTAB  Abd: soft, NT/ND. Incision w/ gauze in place, c/d/i. Ostomy is pink and viable, w/ gas/stool in bag. Moses drain w/ SS.  Ext: no pedal edema or calf tenderness noted     LABS                        13.3   12.38 )-----------( 454      ( 27 Sep 2024 05:56 )             41.9       09-27    139  |  110[H]  |  13  ----------------------------<  84  4.1   |  23  |  0.93    Ca    9.4      27 Sep 2024 05:56  Phos  3.5     09-27  Mg     2.2     09-27      < from: CT Pelvis No Cont (09.25.24 @ 12:16) >    ACC: [id number] EXAM:  CT PELVIS ONLY   ORDERED BY: MADELEINE REYES     PROCEDURE DATE:  09/25/2024        INTERPRETATION:  CLINICAL INFORMATION: Status post repair of colovesical   fistula    COMPARISON: CT abdomen pelvis performed on 9/24/2024.    CONTRAST/COMPLICATIONS:  IV Contrast: None  Oral Contrast: None  Complications: None    PROCEDURE:  CT of the Pelvis was performed (CT Cystogram).  Precontrast images were obtained through the pelvis followed by imaging   after the installation of adilute mixture of [id number] via a Agosto   catheter.  Sagittal and coronal reformats were performed.    FINDINGS:  BLADDER: Agosto catheter in place. Redemonstration of an intramural   collection at the bladder dome measuring 3.8 x 2.0 x 1.0 cm, which is   closely associated with the colonic stump (105:46). No leak or   extravasation identified.  REPRODUCTIVE ORGANS: Prostate gland is within normal limits. Few coarse   calcifications.    LYMPH NODES: Subcentimeter pelvic nodes.    VISUALIZED PORTIONS:  ABDOMINAL ORGANS: Within normal limits.  BOWEL: Left lower quadrant colostomy. Wall thickening involving   visualized colon as well as portion of the colon at the colostomy.  PERITONEUM/RETROPERITONEUM: Trace free fluid pelvis. Surgical drain   terminates in the left lower quadrant.  VESSELS: Atherosclerotic changes.  ABDOMINAL WALL: Within normal limits.  BONES: Within normal limits.    IMPRESSION:    Redemonstration of an intramural collection at the bladder dome measuring   3.8 x 2.0 x 1.0 cm, which is closely associated with the colonic stump.   Residual fistulous tract is not excluded.    No bladder leak or extravasation identified.    Additional findings as above.    --- End of Report ---    WILL TEJEDA MD  This document has been electronically signed. Sep 25 2024  4:52PM    < end of copied text >

## 2024-09-27 NOTE — DISCHARGE NOTE PROVIDER - HOSPITAL COURSE
49 y/o male PMHx anixety, right epididymoorchitis in April, current smoker, former ETOH (sober 11 years) presents with lower abdominal pain for a few days. Had vomiting 2 days ago. Last BM was loose just before coming to the ER. Last known flatus was 2 days ago. Pt never had a colonoscopy before. No history of abnormal BMs. Pt has been voiding dark urine. Patient denies fever, chills, constipation, diarrhea, melena, hematuria, CP, SOB. CT findings show Markedly narrowed segment of sigmoid colon shows marked wall thickening and associated mucosal hyper enhancement; it is the explicit culprit for the large bowel obstruction, abscess as complication from colovesical fistula formation. Patient denies prior incident. Patient admitted with large bowel obstruction, taken to OR for exploratory laparotomy, take down of colovesical fistula, repair of urinary bladder, and creation of ostomy. Patient transferred to ICU post operatively, and was extubated. Remainder of post operative course unremarkable, CT urogram on POD#6 with no extravasation. Agosto catheter removed and patient voiding on own. Stable for discharge from surgical standpoint. 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 Had vomiting 2 days ago. Last BM was loose just before coming to the ER. Last known flatus was 2 days ago. Pt never had a colonoscopy before. No history of abnormal BMs. Pt has been voiding dark urine. Patient denies fever, chills, constipation, diarrhea, melena, hematuria, CP, SOB. CT findings show Markedly narrowed segment of sigmoid colon shows marked wall thickening and associated mucosal hyper enhancement; it is the explicit culprit for the large bowel obstruction, abscess as complication from colovesical fistula formation. Patient denies prior incident. Patient admitted with large bowel obstruction, taken to OR for exploratory laparotomy, take down of colovesical fistula, repair of urinary bladder, and creation of ostomy. Patient transferred to ICU post operatively, and was extubated. Remainder of post operative course unremarkable, CT urogram on POD#6 with no extravasation. Agosto catheter removed and patient voiding on own. Stable for discharge from surgical standpoint. Pt instructed to follow up with Dr. Dsouza in one week.

## 2024-09-27 NOTE — BH CONSULTATION LIAISON PROGRESS NOTE - NSBHCONSULTFOLLOWAFTERCARE_PSY_A_CORE FT
will give CBT referrals on discharge 

## 2024-09-27 NOTE — BH CONSULTATION LIAISON PROGRESS NOTE - NSBHCHARTREVIEWVS_PSY_A_CORE FT
Vital Signs Last 24 Hrs  T(C): 36.6 (27 Sep 2024 11:14), Max: 37 (26 Sep 2024 16:54)  T(F): 97.9 (27 Sep 2024 11:14), Max: 98.6 (26 Sep 2024 16:54)  HR: 95 (27 Sep 2024 11:14) (62 - 801)  BP: 112/74 (27 Sep 2024 11:14) (105/69 - 116/67)  BP(mean): 81 (26 Sep 2024 23:09) (81 - 81)  RR: 18 (27 Sep 2024 11:14) (18 - 18)  SpO2: 97% (27 Sep 2024 11:14) (95% - 98%)    Parameters below as of 27 Sep 2024 06:11  Patient On (Oxygen Delivery Method): room air    
Vital Signs Last 24 Hrs  T(C): 36.9 (24 Sep 2024 10:38), Max: 37 (23 Sep 2024 17:10)  T(F): 98.4 (24 Sep 2024 10:38), Max: 98.6 (23 Sep 2024 17:10)  HR: 88 (24 Sep 2024 10:38) (60 - 100)  BP: 126/76 (24 Sep 2024 10:38) (124/77 - 151/94)  BP(mean): --  RR: 17 (24 Sep 2024 10:38) (17 - 18)  SpO2: 96% (24 Sep 2024 10:38) (94% - 96%)    Parameters below as of 24 Sep 2024 10:38  Patient On (Oxygen Delivery Method): room air    
Vital Signs Last 24 Hrs  T(C): 37.1 (25 Sep 2024 11:23), Max: 37.1 (25 Sep 2024 11:23)  T(F): 98.8 (25 Sep 2024 11:23), Max: 98.8 (25 Sep 2024 11:23)  HR: 88 (25 Sep 2024 11:30) (80 - 100)  BP: 116/78 (25 Sep 2024 11:23) (104/70 - 165/95)  BP(mean): --  RR: 18 (25 Sep 2024 11:23) (18 - 18)  SpO2: 99% (25 Sep 2024 11:30) (96% - 100%)    Parameters below as of 25 Sep 2024 11:30  Patient On (Oxygen Delivery Method): room air

## 2024-09-27 NOTE — DISCHARGE NOTE PROVIDER - NSDCFUADDAPPT_GEN_ALL_CORE_FT
Please call office to make an appointment with Dr. Dsouza in two weeks Please call office to make an appointment with Dr. Dsouza in one week    Please follow up with your primary care physician regarding your hospitalization. Please schedule an appointment with your primary care provider within two weeks after your discharge to review your hospital course.

## 2024-09-27 NOTE — BH CONSULTATION LIAISON PROGRESS NOTE - NSBHCHARTREVIEWINVESTIGATE_PSY_A_CORE FT
CT abdomen  Markedly narrowed segment of sigmoid colon shows marked wall thickening and associated mucosal hyperenhancement; it is the explicit   culprit for the large bowel obstruction. This is highly suspicious for a primary colonic neoplasm although, an ongoing infectious/inflammatory   process could have a similar appearance in the proper clinical scenario.
CT abdomen  Markedly narrowed segment of sigmoid colon shows marked wall thickening and associated mucosal hyperenhancement; it is the explicit   culprit for the large bowel obstruction. This is highly suspicious for a primary colonic neoplasm although, an ongoing infectious/inflammatory   process could have a similar appearance in the proper clinical scenario.
< from: CT Pelvis No Cont (09.25.24 @ 12:16) Redemonstration of an intramural collection at the bladder dome measuring 3.8 x 2.0 x 1.0 cm, which is closely associated with the colonic stump. Residual fistulous tract is not excluded.

## 2024-09-27 NOTE — BH CONSULTATION LIAISON PROGRESS NOTE - NSBHCHARTREVIEWLAB_PSY_A_CORE FT
09-24    141  |  112[H]  |  9   ----------------------------<  93  3.9   |  26  |  0.77    Ca    8.9      24 Sep 2024 07:37  Phos  3.7     09-24  Mg     2.1     09-24    
09-25    139  |  111[H]  |  11  ----------------------------<  89  3.7   |  22  |  0.82    Ca    8.8      25 Sep 2024 06:10  Phos  3.7     09-25  Mg     2.0     09-25    
09-27    139  |  110[H]  |  13  ----------------------------<  84  4.1   |  23  |  0.93    Ca    9.4      27 Sep 2024 05:56  Phos  3.5     09-27  Mg     2.2     09-27

## 2024-09-27 NOTE — BH CONSULTATION LIAISON PROGRESS NOTE - NSBHCONSULTRECOMMENDOTHER_PSY_A_CORE FT
9/23/24: increase nicotine patch to 14mcg (1 ppd smoker since teens) and bridge with PRN lozenges 9edentulous; cannot chew gum)  - can use PRN xanax 0.5mg PO up to TID for now; then reduce to BID on discharge (Pt and mother both confirmed anxiousness reduced on Xanax)  9/24/24: literature left with Patient to start reading about cognitive distortions and how to overcome them. Encouraged to start watching Cognitive Behavioral Therapy introductory videos online ([employer] titles given)   - has capacity to make his medical decisions and can leave AMA  9/25/24: reduce PRN Xanax to QD; has not used it in last 2 days   - encouraged Pt to watch a CBT informative video on [employer] this evening   9/26/24: Patient medically cleared for discharge   - received list of therapists accepting his insurance near his geographical area who do CBT/treat anxiety    - can go home with RX for Xanax 0.5mg PO qd prn for 14 days  - [facility] signing off

## 2024-09-27 NOTE — BH CONSULTATION LIAISON PROGRESS NOTE - NSBHATTESTBILLING_PSY_A_CORE
24550-Bmchwqklwg OBS or IP - low complexity OR 25-34 mins
64224-Jksntwxbhx OBS or IP - low complexity OR 25-34 mins
70852-Avpurkbvve OBS or IP - low complexity OR 25-34 mins

## 2024-09-27 NOTE — DISCHARGE NOTE PROVIDER - CARE PROVIDER_API CALL
Bry Dsouza  Surgery  [address], [address]  [place] [id number]  Phone: [phone]  Fax: [phone]  Follow Up Time: 2 weeks   Bry Dsouza  Surgery  [address] [facility], [address]  [place] [address]  Phone: [phone]  Fax: [phone]  Follow Up Time: 1 week

## 2024-09-27 NOTE — BH CONSULTATION LIAISON PROGRESS NOTE - NSICDXBHPRIMARYDX_PSY_ALL_CORE
Anxiety   F41.9  
Detail Level: Detailed
Cpt Desired: 
Anxiety   F41.9  
Showing: no hyphae
Koh Intro Text (From The.....): A KOH prep was ordered and evaluated from the
Koh Procedure Text (Tissue Harvesting Technique): A 15-blade scalpel was used to scrape the skin. The skin scrapings were placed on a glass slide, covered with a coverslip and a KOH solution was applied.
Anxiety   F41.9

## 2024-09-27 NOTE — BH CONSULTATION LIAISON PROGRESS NOTE - NSBHFUPINTERVALHXFT_PSY_A_CORE
No significant interval events. Patient has not utilized any PRN Xanax since initial assessment but open to having an RX for it on discharge as a "back up: in kendra ehe needs to use it.  No complaints otherwise, physically comfortable. Pt has remained calm, cooperative, medication and treatment compliant. Same psychiatric clinical presentation consistent with baseline.  No significant interval events. Patient has not utilized any PRN Xanax since initial assessment but open to having an RX for it on discharge as a "back up: in kendra ehe needs to use it.  No complaints otherwise, much more comfortable on nicotine patch at higher doses and is physically comfortable. Pt has remained calm, cooperative, medication and treatment compliant. Same psychiatric clinical presentation consistent with baseline.

## 2024-09-27 NOTE — DISCHARGE NOTE PROVIDER - NSDCFUADDINST_GEN_ALL_CORE_FT
You will be discharged with ESAU drains. You will need to empty them and record outputs accurately. This will be taught to you by the nursing staff. Keep drain to suction at all times. Please do not remove the ESAU drains. They will be removed in the office. Please bring to the office accurate records of output. Change dressing at drain site daily with dry 4x4 guaze and tape over.   Ostomy care as instructed  Wound Care: change midlines dressing daily with 4x4 guaze and paper tape over

## 2024-09-27 NOTE — DISCHARGE NOTE PROVIDER - NSDCMRMEDTOKEN_GEN_ALL_CORE_FT
acetaminophen 325 mg oral tablet: 3 tab(s) orally every 6 hours  ALPRAZolam 0.5 mg oral tablet: 1 tab(s) orally once a day As needed anxiety  ipratropium-albuterol 0.5 mg-2.5 mg/3 mL inhalation solution: 3 milliliter(s) inhaled every 6 hours  nicotine: 2 milligram(s) orally every 3 hours as needed for nicotine dependence 2 mg every 3 hours PRN breakthrough nicotine dependence  nicotine 14 mg/24 hr transdermal film, extended release: 1 patch transdermal once a day  pantoprazole 40 mg intravenous injection: 40 milligram(s) intravenous once a day

## 2024-09-27 NOTE — DISCHARGE NOTE PROVIDER - NSDCCPTREATMENT_GEN_ALL_CORE_FT
PRINCIPAL PROCEDURE  Procedure: Repair of colovesical fistula  Findings and Treatment:       SECONDARY PROCEDURE  Procedure: Exploratory laparotomy  Findings and Treatment:     Procedure: Dinorah's procedure  Findings and Treatment:

## 2024-09-27 NOTE — BH CONSULTATION LIAISON PROGRESS NOTE - CURRENT MEDICATION
MEDICATIONS  (STANDING):  acetaminophen     Tablet .. 975 milliGRAM(s) Oral every 6 hours  albuterol/ipratropium for Nebulization 3 milliLiter(s) Nebulizer every 6 hours  chlorhexidine 2% Cloths 1 Application(s) Topical <User Schedule>  heparin   Injectable 5000 Unit(s) SubCutaneous every 8 hours  influenza   Vaccine 0.5 milliLiter(s) IntraMuscular once  nicotine -  14 mG/24Hr(s) Patch 1 Patch Transdermal daily    MEDICATIONS  (PRN):  ALPRAZolam 0.5 milliGRAM(s) Oral daily PRN anxiety  benzocaine/menthol Lozenge 1 Lozenge Oral every 3 hours PRN Sore Throat  nicotine  Polacrilex Lozenge 2 milliGRAM(s) Oral every 3 hours PRN nicotine dependence  ondansetron Injectable 4 milliGRAM(s) IV Push every 6 hours PRN Nausea and/or Vomiting  oxyCODONE    IR 5 milliGRAM(s) Oral every 6 hours PRN Moderate Pain (4 - 6)  oxyCODONE    IR 10 milliGRAM(s) Oral every 6 hours PRN Severe Pain (7 - 10)  
MEDICATIONS  (STANDING):  acetaminophen     Tablet .. 975 milliGRAM(s) Oral every 6 hours  albuterol/ipratropium for Nebulization 3 milliLiter(s) Nebulizer every 6 hours  chlorhexidine 2% Cloths 1 Application(s) Topical <User Schedule>  heparin   Injectable 5000 Unit(s) SubCutaneous every 8 hours  influenza   Vaccine 0.5 milliLiter(s) IntraMuscular once  nicotine -  14 mG/24Hr(s) Patch 1 Patch Transdermal daily  piperacillin/tazobactam IVPB.. 3.375 Gram(s) IV Intermittent every 8 hours    MEDICATIONS  (PRN):  ALPRAZolam 0.5 milliGRAM(s) Oral daily PRN anxiety  benzocaine/menthol Lozenge 1 Lozenge Oral every 3 hours PRN Sore Throat  nicotine  Polacrilex Lozenge 2 milliGRAM(s) Oral every 3 hours PRN nicotine dependence  ondansetron Injectable 4 milliGRAM(s) IV Push every 6 hours PRN Nausea and/or Vomiting  oxyCODONE    IR 10 milliGRAM(s) Oral every 6 hours PRN Severe Pain (7 - 10)  oxyCODONE    IR 5 milliGRAM(s) Oral every 6 hours PRN Moderate Pain (4 - 6)  
MEDICATIONS  (STANDING):  acetaminophen     Tablet .. 975 milliGRAM(s) Oral every 6 hours  albuterol/ipratropium for Nebulization 3 milliLiter(s) Nebulizer every 6 hours  chlorhexidine 2% Cloths 1 Application(s) Topical <User Schedule>  heparin   Injectable 5000 Unit(s) SubCutaneous every 8 hours  influenza   Vaccine 0.5 milliLiter(s) IntraMuscular once  nicotine -  14 mG/24Hr(s) Patch 1 Patch Transdermal daily  piperacillin/tazobactam IVPB.. 3.375 Gram(s) IV Intermittent every 8 hours    MEDICATIONS  (PRN):  ALPRAZolam 0.5 milliGRAM(s) Oral three times a day PRN anxiety  benzocaine/menthol Lozenge 1 Lozenge Oral every 3 hours PRN Sore Throat  nicotine  Polacrilex Lozenge 2 milliGRAM(s) Oral every 3 hours PRN nicotine dependence  ondansetron Injectable 4 milliGRAM(s) IV Push every 6 hours PRN Nausea and/or Vomiting  oxyCODONE    IR 5 milliGRAM(s) Oral every 6 hours PRN Moderate Pain (4 - 6)  oxyCODONE    IR 10 milliGRAM(s) Oral every 6 hours PRN Severe Pain (7 - 10)

## 2024-09-28 LAB
ANION GAP SERPL CALC-SCNC: 2 MMOL/L — LOW (ref 5–17)
BASOPHILS # BLD AUTO: 0.08 K/UL — SIGNIFICANT CHANGE UP (ref 0–0.2)
BASOPHILS NFR BLD AUTO: 0.7 % — SIGNIFICANT CHANGE UP (ref 0–2)
BUN SERPL-MCNC: 18 MG/DL — SIGNIFICANT CHANGE UP (ref 7–23)
CALCIUM SERPL-MCNC: 9.3 MG/DL — SIGNIFICANT CHANGE UP (ref 8.5–10.1)
CHLORIDE SERPL-SCNC: 108 MMOL/L — SIGNIFICANT CHANGE UP (ref 96–108)
CO2 SERPL-SCNC: 30 MMOL/L — SIGNIFICANT CHANGE UP (ref 22–31)
CREAT SERPL-MCNC: 1.15 MG/DL — SIGNIFICANT CHANGE UP (ref 0.5–1.3)
EGFR: 78 ML/MIN/1.73M2 — SIGNIFICANT CHANGE UP
EOSINOPHIL # BLD AUTO: 0.64 K/UL — HIGH (ref 0–0.5)
EOSINOPHIL NFR BLD AUTO: 5.9 % — SIGNIFICANT CHANGE UP (ref 0–6)
GLUCOSE SERPL-MCNC: 89 MG/DL — SIGNIFICANT CHANGE UP (ref 70–99)
HCT VFR BLD CALC: 41.5 % — SIGNIFICANT CHANGE UP (ref 39–50)
HGB BLD-MCNC: 13.4 G/DL — SIGNIFICANT CHANGE UP (ref 13–17)
IMM GRANULOCYTES NFR BLD AUTO: 1.5 % — HIGH (ref 0–0.9)
LYMPHOCYTES # BLD AUTO: 2.53 K/UL — SIGNIFICANT CHANGE UP (ref 1–3.3)
LYMPHOCYTES # BLD AUTO: 23.1 % — SIGNIFICANT CHANGE UP (ref 13–44)
MCHC RBC-ENTMCNC: 31.1 PG — SIGNIFICANT CHANGE UP (ref 27–34)
MCHC RBC-ENTMCNC: 32.3 G/DL — SIGNIFICANT CHANGE UP (ref 32–36)
MCV RBC AUTO: 96.3 FL — SIGNIFICANT CHANGE UP (ref 80–100)
MONOCYTES # BLD AUTO: 0.84 K/UL — SIGNIFICANT CHANGE UP (ref 0–0.9)
MONOCYTES NFR BLD AUTO: 7.7 % — SIGNIFICANT CHANGE UP (ref 2–14)
NEUTROPHILS # BLD AUTO: 6.69 K/UL — SIGNIFICANT CHANGE UP (ref 1.8–7.4)
NEUTROPHILS NFR BLD AUTO: 61.1 % — SIGNIFICANT CHANGE UP (ref 43–77)
NRBC # BLD: 0 /100 WBCS — SIGNIFICANT CHANGE UP (ref 0–0)
PLATELET # BLD AUTO: 754 K/UL — HIGH (ref 150–400)
POTASSIUM SERPL-MCNC: 4.1 MMOL/L — SIGNIFICANT CHANGE UP (ref 3.5–5.3)
POTASSIUM SERPL-SCNC: 4.1 MMOL/L — SIGNIFICANT CHANGE UP (ref 3.5–5.3)
RBC # BLD: 4.31 M/UL — SIGNIFICANT CHANGE UP (ref 4.2–5.8)
RBC # FLD: 14.6 % — HIGH (ref 10.3–14.5)
SODIUM SERPL-SCNC: 140 MMOL/L — SIGNIFICANT CHANGE UP (ref 135–145)
WBC # BLD: 10.94 K/UL — HIGH (ref 3.8–10.5)
WBC # FLD AUTO: 10.94 K/UL — HIGH (ref 3.8–10.5)

## 2024-09-28 RX ADMIN — Medication 5000 UNIT(S): at 14:15

## 2024-09-28 RX ADMIN — Medication 975 MILLIGRAM(S): at 22:10

## 2024-09-28 RX ADMIN — CHLORHEXIDINE GLUCONATE ORAL RINSE 1 APPLICATION(S): 1.2 SOLUTION DENTAL at 05:48

## 2024-09-28 RX ADMIN — IPRATROPIUM BROMIDE AND ALBUTEROL SULFATE 3 MILLILITER(S): .5; 3 SOLUTION RESPIRATORY (INHALATION) at 12:13

## 2024-09-28 RX ADMIN — Medication 975 MILLIGRAM(S): at 14:43

## 2024-09-28 RX ADMIN — Medication 975 MILLIGRAM(S): at 22:40

## 2024-09-28 RX ADMIN — IPRATROPIUM BROMIDE AND ALBUTEROL SULFATE 3 MILLILITER(S): .5; 3 SOLUTION RESPIRATORY (INHALATION) at 17:48

## 2024-09-28 RX ADMIN — Medication 975 MILLIGRAM(S): at 15:43

## 2024-09-28 RX ADMIN — Medication 975 MILLIGRAM(S): at 10:04

## 2024-09-28 RX ADMIN — Medication 1 PATCH: at 18:15

## 2024-09-28 RX ADMIN — Medication 1 PATCH: at 12:26

## 2024-09-28 RX ADMIN — Medication 5000 UNIT(S): at 05:35

## 2024-09-28 RX ADMIN — Medication 5000 UNIT(S): at 22:11

## 2024-09-28 RX ADMIN — Medication 1 PATCH: at 11:39

## 2024-09-28 RX ADMIN — PIPERACILLIN SODIUM AND TAZOBACTAM SODIUM 25 GRAM(S): 12; 1.5 INJECTION, POWDER, LYOPHILIZED, FOR SOLUTION INTRAVENOUS at 14:44

## 2024-09-28 RX ADMIN — PIPERACILLIN SODIUM AND TAZOBACTAM SODIUM 25 GRAM(S): 12; 1.5 INJECTION, POWDER, LYOPHILIZED, FOR SOLUTION INTRAVENOUS at 05:38

## 2024-09-28 RX ADMIN — Medication 975 MILLIGRAM(S): at 09:04

## 2024-09-28 NOTE — PROGRESS NOTE ADULT - SUBJECTIVE AND OBJECTIVE BOX
SURGERY PROGRESS HPI:  POD#9  Pt seen and examined at bedside. Denies pain or complaints. Pt tolerating regular diet. Pt denies nausea and vomiting. Having ostomy function. Voiding well. Pt denies chest pain, SOB, dizziness, fever, chills. Ambulating.    Vital Signs Last 24 Hrs  T(C): 36.4 (27 Sep 2024 17:06), Max: 36.6 (27 Sep 2024 11:14)  T(F): 97.6 (27 Sep 2024 17:06), Max: 97.9 (27 Sep 2024 11:14)  HR: 78 (27 Sep 2024 17:06) (78 - 95)  BP: 121/75 (27 Sep 2024 17:06) (112/74 - 121/75)  BP(mean): --  RR: 18 (27 Sep 2024 17:06) (18 - 18)  SpO2: 98% (27 Sep 2024 17:06) (96% - 98%)    Parameters below as of 27 Sep 2024 06:11  Patient On (Oxygen Delivery Method): room air    PHYSICAL EXAM:  CONSTITUTIONAL: NAD  HEENT: Atraumatic, Normocephalic  RESPIRATORY: Clear to ausculation, bilaterally   CARDIOVASCULAR: RRR S1S2  GASTROINTESTINAL: Ostomy dark with stool/air in the bag. Moses with serosanguinous output. Dressing and wound clean/dry/intact. non distended, +BS, soft, appropriate incisional tenderness  MUSCULOSKELETAL: calf soft, non tender b/l    I&O's Detail    26 Sep 2024 07:01  -  27 Sep 2024 07:00  --------------------------------------------------------  IN:    Oral Fluid: 480 mL  Total IN: 480 mL    OUT:    Bulb (mL): 45 mL    Indwelling Catheter - Urethral (mL): 250 mL    Voided (mL): 375 mL  Total OUT: 670 mL    Total NET: -190 mL      27 Sep 2024 07:01  -  28 Sep 2024 05:10  --------------------------------------------------------  IN:    Oral Fluid: 721 mL  Total IN: 721 mL    OUT:    Bulb (mL): 13 mL  Total OUT: 13 mL    Total NET: 708 mL    LABS:                        13.3   12.38 )-----------( 454      ( 27 Sep 2024 05:56 )             41.9     09-27    139  |  110[H]  |  13  ----------------------------<  84  4.1   |  23  |  0.93    Ca    9.4      27 Sep 2024 05:56  Phos  3.5     09-27  Mg     2.2     09-27    Urinalysis Basic - ( 27 Sep 2024 05:56 )    Color: x / Appearance: x / SG: x / pH: x  Gluc: 84 mg/dL / Ketone: x  / Bili: x / Urobili: x   Blood: x / Protein: x / Nitrite: x   Leuk Esterase: x / RBC: x / WBC x   Sq Epi: x / Non Sq Epi: x / Bacteria: x        A/P: 47 yo M with PMH of  anixety, right epididymoorchitis in April, current smoker, former ETOH (sober 11 years), COPD presents with lower abdominal pain for a few days, found with 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 OR Cx: Ecoli. CT 9/25: No bladder leak or extravasation identified. Agosto d/c'd 9/26, PVR: 17.   - IV abx   - Continue diet  - recommend tylenol ATC, narcotics prn   - drain care, monitor output.   - local wound care per surgery team   - DVT ppx, GI ppx  SURGERY PROGRESS HPI:  POD#9  Pt seen and examined at bedside. Denies pain or complaints. Pt tolerating regular diet. Pt denies nausea and vomiting. Having ostomy function. Voiding well. Pt denies chest pain, SOB, dizziness, fever, chills. Ambulating.    Vital Signs Last 24 Hrs  T(C): 36.4 (27 Sep 2024 17:06), Max: 36.6 (27 Sep 2024 11:14)  T(F): 97.6 (27 Sep 2024 17:06), Max: 97.9 (27 Sep 2024 11:14)  HR: 78 (27 Sep 2024 17:06) (78 - 95)  BP: 121/75 (27 Sep 2024 17:06) (112/74 - 121/75)  BP(mean): --  RR: 18 (27 Sep 2024 17:06) (18 - 18)  SpO2: 98% (27 Sep 2024 17:06) (96% - 98%)    Parameters below as of 27 Sep 2024 06:11  Patient On (Oxygen Delivery Method): room air    PHYSICAL EXAM:  CONSTITUTIONAL: NAD  HEENT: Atraumatic, Normocephalic  RESPIRATORY: Clear to ausculation, bilaterally   CARDIOVASCULAR: RRR S1S2  GASTROINTESTINAL: Ostomy dark with stool/air in the bag. Moses with serosanguinous output. Dressing and wound clean/dry/intact. Staples intact and incision healing well, incision cleansed with NS, dressing changed. non distended, +BS, soft, appropriate incisional tenderness  MUSCULOSKELETAL: calf soft, non tender b/l    I&O's Detail    26 Sep 2024 07:01  -  27 Sep 2024 07:00  --------------------------------------------------------  IN:    Oral Fluid: 480 mL  Total IN: 480 mL    OUT:    Bulb (mL): 45 mL    Indwelling Catheter - Urethral (mL): 250 mL    Voided (mL): 375 mL  Total OUT: 670 mL    Total NET: -190 mL      27 Sep 2024 07:01  -  28 Sep 2024 05:10  --------------------------------------------------------  IN:    Oral Fluid: 721 mL  Total IN: 721 mL    OUT:    Bulb (mL): 13 mL  Total OUT: 13 mL    Total NET: 708 mL    LABS:                        13.3   12.38 )-----------( 454      ( 27 Sep 2024 05:56 )             41.9     09-27    139  |  110[H]  |  13  ----------------------------<  84  4.1   |  23  |  0.93    Ca    9.4      27 Sep 2024 05:56  Phos  3.5     09-27  Mg     2.2     09-27    Urinalysis Basic - ( 27 Sep 2024 05:56 )    Color: x / Appearance: x / SG: x / pH: x  Gluc: 84 mg/dL / Ketone: x  / Bili: x / Urobili: x   Blood: x / Protein: x / Nitrite: x   Leuk Esterase: x / RBC: x / WBC x   Sq Epi: x / Non Sq Epi: x / Bacteria: x        A/P: 47 yo M with PMH of  anixety, right epididymoorchitis in April, current smoker, former ETOH (sober 11 years), COPD presents with lower abdominal pain for a few days, found with 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 OR Cx: Ecoli. CT 9/25: No bladder leak or extravasation identified. Agosto d/c'd 9/26, PVR: 17.   - IV abx   - Continue diet  - recommend tylenol ATC, narcotics prn   - drain care, monitor output.   - local wound care per surgery team   - DVT ppx, GI ppx

## 2024-09-29 PROCEDURE — 93010 ELECTROCARDIOGRAM REPORT: CPT

## 2024-09-29 RX ORDER — PANTOPRAZOLE SODIUM 40 MG/1
40 TABLET, DELAYED RELEASE ORAL DAILY
Refills: 0 | Status: DISCONTINUED | OUTPATIENT
Start: 2024-09-29 | End: 2024-10-01

## 2024-09-29 RX ADMIN — Medication 975 MILLIGRAM(S): at 17:29

## 2024-09-29 RX ADMIN — Medication 975 MILLIGRAM(S): at 22:34

## 2024-09-29 RX ADMIN — IPRATROPIUM BROMIDE AND ALBUTEROL SULFATE 3 MILLILITER(S): .5; 3 SOLUTION RESPIRATORY (INHALATION) at 11:30

## 2024-09-29 RX ADMIN — PANTOPRAZOLE SODIUM 40 MILLIGRAM(S): 40 TABLET, DELAYED RELEASE ORAL at 13:30

## 2024-09-29 RX ADMIN — Medication 1 PATCH: at 12:15

## 2024-09-29 RX ADMIN — CHLORHEXIDINE GLUCONATE ORAL RINSE 1 APPLICATION(S): 1.2 SOLUTION DENTAL at 05:25

## 2024-09-29 RX ADMIN — Medication 5000 UNIT(S): at 05:34

## 2024-09-29 RX ADMIN — Medication 975 MILLIGRAM(S): at 21:34

## 2024-09-29 RX ADMIN — IPRATROPIUM BROMIDE AND ALBUTEROL SULFATE 3 MILLILITER(S): .5; 3 SOLUTION RESPIRATORY (INHALATION) at 17:22

## 2024-09-29 RX ADMIN — Medication 975 MILLIGRAM(S): at 09:31

## 2024-09-29 RX ADMIN — Medication 975 MILLIGRAM(S): at 08:31

## 2024-09-29 RX ADMIN — Medication 1 PATCH: at 13:30

## 2024-09-29 RX ADMIN — IPRATROPIUM BROMIDE AND ALBUTEROL SULFATE 3 MILLILITER(S): .5; 3 SOLUTION RESPIRATORY (INHALATION) at 23:36

## 2024-09-29 RX ADMIN — Medication 5000 UNIT(S): at 16:31

## 2024-09-29 RX ADMIN — Medication 5000 UNIT(S): at 21:34

## 2024-09-29 RX ADMIN — Medication 1 PATCH: at 09:59

## 2024-09-29 RX ADMIN — Medication 975 MILLIGRAM(S): at 16:29

## 2024-09-29 RX ADMIN — IPRATROPIUM BROMIDE AND ALBUTEROL SULFATE 3 MILLILITER(S): .5; 3 SOLUTION RESPIRATORY (INHALATION) at 00:04

## 2024-09-29 NOTE — PROGRESS NOTE ADULT - SUBJECTIVE AND OBJECTIVE BOX
Postoperative Day #: 10  Patient seen and examined bedside resting comfortably.  No complaints offered.   Abdominal pain is well controlled.  Denies nausea and vomiting. Tolerating diet.  Denies chest pain, dyspnea, cough.    T(F): 97.6 (09-29-24 @ 05:03), Max: 98 (09-28-24 @ 16:52)  HR: 64 (09-29-24 @ 05:03) (63 - 89)  BP: 108/74 (09-29-24 @ 05:03) (108/57 - 119/83)  RR: 18 (09-29-24 @ 05:03) (18 - 18)  SpO2: 98% (09-29-24 @ 05:03) (97% - 98%)  Wt(kg): --  CAPILLARY BLOOD GLUCOSE          PHYSICAL EXAM:  General: NAD  Neuro:  Alert & oriented x 3  HEENT: NCAT, EOMI, conjunctiva clear  CV: +S1+S2 regular rate and rhythm  Lung: respirations nonlabored, good inspiratory effort  Abdomen: soft, nondistended, midline incision with staples in place, healing well. Ostomy dark with gas and stool present in bag. Moses drain with serosanguinous output.   Extremities: no pedal edema or calf tenderness noted     LABS:                        13.4   10.94 )-----------( 754      ( 28 Sep 2024 10:20 )             41.5     09-28    140  |  108  |  18  ----------------------------<  89  4.1   |  30  |  1.15    Ca    9.3      28 Sep 2024 10:20          I&O: I&O's Detail    27 Sep 2024 07:01  -  28 Sep 2024 07:00  --------------------------------------------------------  IN:    Oral Fluid: 721 mL  Total IN: 721 mL    OUT:    Bulb (mL): 28 mL  Total OUT: 28 mL    Total NET: 693 mL      28 Sep 2024 07:01  -  29 Sep 2024 06:50  --------------------------------------------------------  IN:  Total IN: 0 mL    OUT:    Voided (mL): 1000 mL  Total OUT: 1000 mL    Total NET: -1000 mL          Impression: 49 yo M with PMH of  anixety, right epididymoorchitis in April, current smoker, former ETOH (sober 11 years), COPD presents with lower abdominal pain for a few days, found with 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 OR Cx: Ecoli. CT 9/25: No bladder leak or extravasation identified. Agosto d/c'd 9/26, PVR: 17.     Plan:   - Continue regular diet  - Continue tylenol ATC, narcotics prn   - Continue drain care, monitor output.   - local wound care per surgery team   - DVT ppx, GI ppx   - Patient is surgically stable for discharge to [facility], awaiting auth

## 2024-09-30 RX ORDER — ALPRAZOLAM 0.5 MG/1
1 TABLET ORAL
Qty: 0 | Refills: 0 | DISCHARGE
Start: 2024-09-30

## 2024-09-30 RX ORDER — PANTOPRAZOLE SODIUM 40 MG/1
40 TABLET, DELAYED RELEASE ORAL
Qty: 0 | Refills: 0 | DISCHARGE
Start: 2024-09-30

## 2024-09-30 RX ORDER — IPRATROPIUM BROMIDE AND ALBUTEROL SULFATE .5; 3 MG/3ML; MG/3ML
3 SOLUTION RESPIRATORY (INHALATION)
Qty: 0 | Refills: 0 | DISCHARGE
Start: 2024-09-30

## 2024-09-30 RX ORDER — ACETAMINOPHEN 325 MG
3 TABLET ORAL
Qty: 0 | Refills: 0 | DISCHARGE
Start: 2024-09-30

## 2024-09-30 RX ADMIN — Medication 975 MILLIGRAM(S): at 15:11

## 2024-09-30 RX ADMIN — Medication 975 MILLIGRAM(S): at 03:27

## 2024-09-30 RX ADMIN — Medication 975 MILLIGRAM(S): at 11:05

## 2024-09-30 RX ADMIN — Medication 1 PATCH: at 07:35

## 2024-09-30 RX ADMIN — Medication 975 MILLIGRAM(S): at 16:11

## 2024-09-30 RX ADMIN — Medication 1 PATCH: at 12:32

## 2024-09-30 RX ADMIN — Medication 975 MILLIGRAM(S): at 10:05

## 2024-09-30 RX ADMIN — IPRATROPIUM BROMIDE AND ALBUTEROL SULFATE 3 MILLILITER(S): .5; 3 SOLUTION RESPIRATORY (INHALATION) at 23:52

## 2024-09-30 RX ADMIN — Medication 5000 UNIT(S): at 21:27

## 2024-09-30 RX ADMIN — Medication 5000 UNIT(S): at 15:11

## 2024-09-30 RX ADMIN — CHLORHEXIDINE GLUCONATE ORAL RINSE 1 APPLICATION(S): 1.2 SOLUTION DENTAL at 06:16

## 2024-09-30 RX ADMIN — Medication 975 MILLIGRAM(S): at 22:13

## 2024-09-30 RX ADMIN — Medication 5000 UNIT(S): at 06:16

## 2024-09-30 RX ADMIN — PANTOPRAZOLE SODIUM 40 MILLIGRAM(S): 40 TABLET, DELAYED RELEASE ORAL at 12:32

## 2024-09-30 RX ADMIN — Medication 975 MILLIGRAM(S): at 21:25

## 2024-09-30 RX ADMIN — Medication 1 PATCH: at 20:57

## 2024-09-30 RX ADMIN — Medication 1 PATCH: at 12:25

## 2024-09-30 RX ADMIN — Medication 975 MILLIGRAM(S): at 04:27

## 2024-09-30 RX ADMIN — IPRATROPIUM BROMIDE AND ALBUTEROL SULFATE 3 MILLILITER(S): .5; 3 SOLUTION RESPIRATORY (INHALATION) at 05:11

## 2024-09-30 NOTE — PROGRESS NOTE ADULT - SUBJECTIVE AND OBJECTIVE BOX
Postoperative Day #: 11  Patient seen and examined bedside resting comfortably.  No complaints offered.   Abdominal pain is well controlled.  Denies nausea and vomiting. Tolerating diet.  Denies chest pain, dyspnea, cough.    T(F): 97.6 (09-30-24 @ 04:56), Max: 97.9 (09-29-24 @ 23:43)  HR: 82 (09-30-24 @ 05:32) (60 - 89)  BP: 104/65 (09-30-24 @ 04:56) (102/68 - 114/74)  RR: 19 (09-30-24 @ 04:56) (18 - 19)  SpO2: 98% (09-30-24 @ 05:32) (97% - 99%)  Wt(kg): --  CAPILLARY BLOOD GLUCOSE          PHYSICAL EXAM:  General: NAD  Neuro:  Alert & oriented x 3  HEENT: NCAT, EOMI, conjunctiva clear  CV: +S1+S2 regular rate and rhythm  Lung:  respirations nonlabored, good inspiratory effort  Abdomen: soft, nondistended, nontender to palpation. Midline incision with gauze dressing in place, c/d/i. Ostomy dark with gas and stool present in bag. Moses drain with 30cc serosanguinous output.   Extremities: no pedal edema or calf tenderness noted       I&O's Detail    29 Sep 2024 07:01  -  30 Sep 2024 07:00  --------------------------------------------------------  IN:  Total IN: 0 mL    OUT:    Bulb (mL): 30 mL    Voided (mL): 500 mL  Total OUT: 530 mL    Total NET: -530 mL      30 Sep 2024 07:01  -  30 Sep 2024 10:45  --------------------------------------------------------  IN:    Oral Fluid: 360 mL  Total IN: 360 mL    OUT:  Total OUT: 0 mL    Total NET: 360 mL          Impression: 47 yo M with PMH of  anixety, right epididymoorchitis in April, current smoker, former ETOH (sober 11 years), COPD presents with lower abdominal pain for a few days, found with 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 OR Cx: Ecoli. CT 9/25: No bladder leak or extravasation identified. Agosto d/c'd 9/26, PVR: 17. Tolerating regular diet.    Plan as discussed with Dr. Remberto Danielle:   - Continue regular diet  - Continue tylenol ATC, narcotics prn   - Continue drain care, monitor output.   - local wound care per surgery team   - DVT ppx   - Patient is surgically stable for discharge to [facility], awaiting auth

## 2024-10-01 ENCOUNTER — TRANSCRIPTION ENCOUNTER (OUTPATIENT)
Age: 49
End: 2024-10-01

## 2024-10-01 VITALS
HEART RATE: 99 BPM | RESPIRATION RATE: 18 BRPM | SYSTOLIC BLOOD PRESSURE: 111 MMHG | DIASTOLIC BLOOD PRESSURE: 73 MMHG | TEMPERATURE: 99 F | OXYGEN SATURATION: 98 %

## 2024-10-01 RX ADMIN — Medication 1 PATCH: at 09:03

## 2024-10-01 RX ADMIN — Medication 975 MILLIGRAM(S): at 16:46

## 2024-10-01 RX ADMIN — Medication 1 PATCH: at 11:14

## 2024-10-01 RX ADMIN — Medication 975 MILLIGRAM(S): at 16:14

## 2024-10-01 RX ADMIN — PANTOPRAZOLE SODIUM 40 MILLIGRAM(S): 40 TABLET, DELAYED RELEASE ORAL at 11:13

## 2024-10-01 RX ADMIN — Medication 1 PATCH: at 11:00

## 2024-10-01 RX ADMIN — Medication 5000 UNIT(S): at 16:15

## 2024-10-01 RX ADMIN — Medication 975 MILLIGRAM(S): at 09:31

## 2024-10-01 RX ADMIN — CHLORHEXIDINE GLUCONATE ORAL RINSE 1 APPLICATION(S): 1.2 SOLUTION DENTAL at 05:34

## 2024-10-01 RX ADMIN — IPRATROPIUM BROMIDE AND ALBUTEROL SULFATE 3 MILLILITER(S): .5; 3 SOLUTION RESPIRATORY (INHALATION) at 11:03

## 2024-10-01 RX ADMIN — Medication 5000 UNIT(S): at 05:34

## 2024-10-01 NOTE — PROGRESS NOTE ADULT - PROVIDER SPECIALTY LIST ADULT
Critical Care
Surgery
Critical Care
Surgery

## 2024-10-01 NOTE — PROGRESS NOTE ADULT - ASSESSMENT
48M POD 6 after ex lap, take down of colovesical fistula, repair of urinary bladder, sigmoid resection with ostomy creation   tolerating regular diet, +GI function  OR Cx: Ecoli  - continue iv abx   - analgesics prn   - regular diet  - f/u CT cystogram, continue andre until imaging results   - DVT ppx, OOB/AAT, IS  
48M with PMH of anxiety, right epididymoorchitis in April, current smoker, former ETOH (sober 11 years), COPD presents with lower abdominal pain for a few days, found with 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   tolerating regular diet, +GI function  IV abx   tylenol ATC, narcotics prn   continue andre. do NOT remove andre.
A/P   JOSSELIN is a 47yo w/ hx of anxiety, epididymo-orchitis, smoking, prior ETOH use (now sober 11 yrs), COPD who presented with lower abd pain w/ LBO 2/2 colovesical fistula who is now POD8 s/p ex-lap with take down of fistula, repair of bladder, and sigmoid resection with ostomy placement on 9/19. He remains afebrile with stable vitals. Agosto discont. from yday w/ PVR 17. mild leukocytosis at 12.38, and CT on 9/25 showing no bladder leak or extravasation.    - Cont pain mgmt: standing tylenol and PRN oxy   - Xanax PRN for anxiety  - VTE ppx w/ SQH   - Cont. solids  - Drain care, monitor output  - Encourage OOB/ambulating   
 49 yo M with PMH of  anixety, right epididymoorchitis in April, current smoker, former ETOH (sober 11 years), COPD presents with lower abdominal pain for a few days, found with 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   IV abx   clear liquid diet  dressing change by surgery  tylenol ATC, narcotics prn   continue andre. do NOT remove andre.  
In summary, JOSSELIN is a 49yo who initially p/w lower abd pain w/ LBO 2/2 colovesical fistula who is now POD2 s/p ex-lap with take down of fistula, bladder repair, and sigmoid resection with ostomy placement on 9/19. Patient continues to remain afebrile with stable vitals, tolerating diet, and ambulating w/ no assistance.     Plan  - c/w DVT ppx with SQH with switch to PO heparin for 3mo   - IV zosyn now d/c, no home abx  - c/w PRN analgesics (tylenol and oxy)   - c/w OOB/ambulating   - Jace drain removed  - Home care instructions w/ social work/nursing aide   - D/C planning 
48M w/ anxiety, right epididymoorchitis, current smoker, former ETOH. Presents w/ abdominal pain found to have LBO. S/P OR for ex-lap w/ rogers's and repair of colovesical fistula. Admitted to ICU intubated, has since been extubated. Doing well.     #Neuro - awake and alert, pain control PRN and tylenol ATC today   #CV - HD stable, never required pressors   #Pulm - satting well on RA, noted to desaturate when sleeping  #ID- continue w/ zosyn for intra-abdominal coverage; pt spiking fever, likely due to purulent peritonitis (noted on brief op note that there was purulence), f/u surgical cx   #Renal/metabolic - normal renal function, hypoMg and Hypophos repleted; keep andre in as pt had bladder repair in OR  #GI- s/p ex-lap w/ rogers's for LBO, ostomy is functioning; discuss w/ GI if can d/c NGT and start PO diet; continue PPI  #Heme - noted drop in hgb and sanginuous output from drain, improved today and no other active bleeding otherwise; monitor for now  #Endo - FS q6 while NPO, otherwise can monitor BG  #Skin - surgical site care  #PPx - HSQ q8  #Dispo- remains in ICU for close monitoring today; prognosis guarded; full code    
48M w/ anxiety, right epididymoorchitis, current smoker, former ETOH. Presents w/ abdominal pain found to have LBO. S/P OR for ex-lap w/ rogers's and repair of colovesical fistula. Admitted to ICU intubated, has since been extubated. Doing well.     #Neuro - awake and alert, pain control PRN and tylenol ATC today; add nicotine patch  #CV - HD stable, never required pressors   #Pulm - pt noted to desaturate - likely due to being chronic smoker, as well as atelectasis/ splinting post-op - continue duonebs, chest PT w/ aerobika and incentive spirometry- educated the pt on importance of using these devices to prevent pneumonia; maintain O2 sat >88%  #ID- continue w/ zosyn for intra-abdominal coverage, surgical fluid cx growing E coli; monitor fever curve and WBC count    #Renal/metabolic - normal renal function, Hypophos repleted; keep andre in as pt had bladder repair in OR  #GI- s/p ex-lap w/ rogers's for LBO, ostomy is functioning although appears black on examination; NGT removed yesterday and tolerating CLD, discuss w/ surgery regarding advancing diet today; continue PPI  #Heme - hgb has been stable, no active bleeding; monitor for now  #Endo - BG acceptable   #Skin - surgical site care  #PPx - HSQ q8  #Dispo- stable for transfer to medical floors today; prognosis guarded; full code; PT, OOB to chair    plan of care discussed w/ pt and his family at bedside, all questions answered

## 2024-10-01 NOTE — PROGRESS NOTE ADULT - SUBJECTIVE AND OBJECTIVE BOX
JOSSELIN is a 49yo M, initially presenting to the ED w/ several days of b/l lower abdominal pain concerning for LBO, who is now POD12 s/p ex-lap, with take down of colovesical fistula, repair of urinary bladder, sigmoid resection with ostomy creation.     Patient was seen and examined at bedside in AM, seated comfortably. No acute overnight events per RN and patient. Patient continues to have well-controlled abdominal pain. He is continuing to tolerate solids and liquids, reports adequate urine output, and has been ambulating to the bathroom and down the hallway without assistance. He endorses +flatus/+BM in ostomy bag. He has received counseling on ostomy mgmt, but expresses anxiety surrounding complications at home. Denies N/V, fevers, chills.       OBJECTIVE    I&O's Detail    30 Sep 2024 07:01  -  01 Oct 2024 07:00  --------------------------------------------------------  IN:    Oral Fluid: 720 mL  Total IN: 720 mL    OUT:    Bulb (mL): 5 mL  Total OUT: 5 mL    Total NET: 715 mL      01 Oct 2024 07:01  -  01 Oct 2024 16:25  --------------------------------------------------------  IN:    Oral Fluid: 840 mL  Total IN: 840 mL    OUT:    Bulb (mL): 20 mL    Colostomy (mL): 600 mL  Total OUT: 620 mL    Total NET: 220 mL       Vital Signs Last 24 Hrs  T(C): 36.6 (01 Oct 2024 10:54), Max: 36.7 (30 Sep 2024 23:51)  T(F): 97.9 (01 Oct 2024 10:54), Max: 98 (30 Sep 2024 23:51)  HR: 93 (01 Oct 2024 11:15) (63 - 95)  BP: 104/73 (01 Oct 2024 10:54) (100/59 - 104/73)  BP(mean): --  RR: 18 (01 Oct 2024 10:54) (18 - 18)  SpO2: 97% (01 Oct 2024 11:15) (96% - 98%)    Parameters below as of 01 Oct 2024 11:15  Patient On (Oxygen Delivery Method): room air      PHYSICAL EXAM:  General: well-appearing, NAD  Neuro:  A&O x3  HEENT: NCAT, EOMI, conjunctiva clear  CV: Normal S1, S2, RRR  Lung: CTAB  Abd: soft, NT/ND. Incision w/ gauze in place, c/d/i. Ostomy is dark brown, w/ gas/stool in bag. Moses drain w/ serosanginous output.   Ext: no pedal edema or calf tenderness noted       Complete Blood Count + Automated Diff (09.28.24 @ 10:20)   WBC Count: 10.94 K/uL  RBC Count: 4.31 M/uL  Hemoglobin: 13.4 g/dL  Hematocrit: 41.5 %  Mean Cell Volume: 96.3 fl  Mean Cell Hemoglobin: 31.1 pg  Mean Cell Hemoglobin Conc: 32.3 g/dL  Red Cell Distrib Width: 14.6 %  Platelet Count - Automated: 754 K/uL  Auto Neutrophil #: 6.69 K/uL  Auto Lymphocyte #: 2.53 K/uL  Auto Monocyte #: 0.84 K/uL  Auto Eosinophil #: 0.64 K/uL  Auto Basophil #: 0.08 K/uL  Auto Neutrophil %: 61.1: Differential percentages must be correlated with absolute numbers for clinical significance. %  Auto Lymphocyte %: 23.1 %  Auto Monocyte %: 7.7 %  Auto Eosinophil %: 5.9 %  Auto Basophil %: 0.7 %  Auto Immature Granulocyte %: 1.5: (Includes meta, myelo and promyelocytes). Mild elevations in immature granulocytes may be seen with many inflammatory processes and pregnancy; clinical correlation suggested. %Nucleated RBC: 0 /100 WBCsBasic Metabolic Panel - STAT (09.28.24 @ 10:20)   CHEMISTRY  Sodium: 140 mmol/L  Potassium: 4.1 mmol/L  Chloride: 108 mmol/L  Carbon Dioxide: 30 mmol/L  Anion Gap: 2 mmol/L  Blood Urea Nitrogen: 18 mg/dL  Creatinine: 1.15 mg/dL  Glucose: 89 mg/dL  Calcium: 9.3 mg/dL  eGFR: 78: The estimated glomerular filtration rate (eGFR) calculation is based on      JOSSELIN is a 49yo M, initially presenting to the ED w/ several days of b/l lower abdominal pain concerning for LBO, who is now POD12 s/p ex-lap, with take down of colovesical fistula, repair of urinary bladder, sigmoid resection with ostomy creation.     Patient was seen and examined at bedside in PM, seated comfortably. No acute overnight events per RN and patient. Patient continues to have well-controlled abdominal pain. He is continuing to tolerate solids and liquids, reports adequate urine output, and has been ambulating to the bathroom and down the hallway without assistance. He endorses +flatus/+BM in ostomy bag. He has received counseling on ostomy mgmt, but expresses anxiety surrounding complications at home. Denies N/V, fevers, chills.       OBJECTIVE    I&O's Detail    30 Sep 2024 07:01  -  01 Oct 2024 07:00  --------------------------------------------------------  IN:    Oral Fluid: 720 mL  Total IN: 720 mL    OUT:    Bulb (mL): 5 mL  Total OUT: 5 mL    Total NET: 715 mL      01 Oct 2024 07:01  -  01 Oct 2024 16:25  --------------------------------------------------------  IN:    Oral Fluid: 840 mL  Total IN: 840 mL    OUT:    Bulb (mL): 20 mL    Colostomy (mL): 600 mL  Total OUT: 620 mL    Total NET: 220 mL       Vital Signs Last 24 Hrs  T(C): 36.6 (01 Oct 2024 10:54), Max: 36.7 (30 Sep 2024 23:51)  T(F): 97.9 (01 Oct 2024 10:54), Max: 98 (30 Sep 2024 23:51)  HR: 93 (01 Oct 2024 11:15) (63 - 95)  BP: 104/73 (01 Oct 2024 10:54) (100/59 - 104/73)  BP(mean): --  RR: 18 (01 Oct 2024 10:54) (18 - 18)  SpO2: 97% (01 Oct 2024 11:15) (96% - 98%)    Parameters below as of 01 Oct 2024 11:15  Patient On (Oxygen Delivery Method): room air      PHYSICAL EXAM:  General: well-appearing, NAD  Neuro:  A&O x3  HEENT: NCAT, EOMI, conjunctiva clear  CV: Normal S1, S2, RRR  Lung: CTAB  Abd: soft, NT/ND. Incision w/ gauze in place, c/d/i. Ostomy is dark brown, w/ gas/stool in bag. Moses drain w/ serosanginous output.   Ext: no pedal edema or calf tenderness noted       Complete Blood Count + Automated Diff (09.28.24 @ 10:20)   WBC Count: 10.94 K/uL  RBC Count: 4.31 M/uL  Hemoglobin: 13.4 g/dL  Hematocrit: 41.5 %  Mean Cell Volume: 96.3 fl  Mean Cell Hemoglobin: 31.1 pg  Mean Cell Hemoglobin Conc: 32.3 g/dL  Red Cell Distrib Width: 14.6 %  Platelet Count - Automated: 754 K/uL  Auto Neutrophil #: 6.69 K/uL  Auto Lymphocyte #: 2.53 K/uL  Auto Monocyte #: 0.84 K/uL  Auto Eosinophil #: 0.64 K/uL  Auto Basophil #: 0.08 K/uL  Auto Neutrophil %: 61.1: Differential percentages must be correlated with absolute numbers for clinical significance. %  Auto Lymphocyte %: 23.1 %  Auto Monocyte %: 7.7 %  Auto Eosinophil %: 5.9 %  Auto Basophil %: 0.7 %  Auto Immature Granulocyte %: 1.5: (Includes meta, myelo and promyelocytes). Mild elevations in immature granulocytes may be seen with many inflammatory processes and pregnancy; clinical correlation suggested. %Nucleated RBC: 0 /100 WBCsBasic Metabolic Panel - STAT (09.28.24 @ 10:20)   CHEMISTRY  Sodium: 140 mmol/L  Potassium: 4.1 mmol/L  Chloride: 108 mmol/L  Carbon Dioxide: 30 mmol/L  Anion Gap: 2 mmol/L  Blood Urea Nitrogen: 18 mg/dL  Creatinine: 1.15 mg/dL  Glucose: 89 mg/dL  Calcium: 9.3 mg/dL  eGFR: 78: The estimated glomerular filtration rate (eGFR) calculation is based on

## 2024-10-01 NOTE — DISCHARGE NOTE NURSING/CASE MANAGEMENT/SOCIAL WORK - NSDCVIVACCINE_GEN_ALL_CORE_FT
Tdap; 04-Apr-2024 10:29; Prachi Haji (HAROON); [employer]; [id number] (Exp. Date: 23-Nov-2025); IntraMuscular; Deltoid Left.; 0.5 milliLiter(s); VIS (VIS Published: 09-May-2013, VIS Presented: 04-Apr-2024);

## 2024-10-01 NOTE — DISCHARGE NOTE NURSING/CASE MANAGEMENT/SOCIAL WORK - NSDCPEFALRISK_GEN_ALL_CORE
For information on Fall & Injury Prevention, visit: [email or web address] OR  [email or web address] OR  https://www.cdc.gov/steadi/patient.html

## 2024-10-01 NOTE — DISCHARGE NOTE NURSING/CASE MANAGEMENT/SOCIAL WORK - PATIENT PORTAL LINK FT
You can access the FollowMyHealth Patient Portal offered by [facility] by registering at the following website: [email or web address]. By joining [facility]’s FollowMyHealth portal, you will also be able to view your health information using other applications (apps) compatible with our system.

## 2024-10-01 NOTE — PROGRESS NOTE ADULT - SUBJECTIVE AND OBJECTIVE BOX
Patient seen and examined at bedside with Dr. Dsouza, pt sitting up in chair, ambulates from bed to restroom without assistance, per RN staff and pt he has been taught how to manage new colostomy. Pt very anxious about being home alone and having an accident.    Abdominal pain is well controlled. Denies nausea and vomiting. Tolerating diet. Denies chest pain, dyspnea, cough.    T(F): 97.9 (10-01-24 @ 05:15), Max: 98.7 (09-30-24 @ 16:00)  HR: 63 (10-01-24 @ 05:15) (63 - 90)  BP: 102/67 (10-01-24 @ 05:15) (100/59 - 110/64)  RR: 18 (10-01-24 @ 05:15) (16 - 18)  SpO2: 98% (10-01-24 @ 05:15) (94% - 98%)  Wt(kg): --  CAPILLARY BLOOD GLUCOSE    PHYSICAL EXAM:  General: NAD  Neuro:  Alert & oriented x 3  HEENT: NCAT, EOMI, conjunctiva clear  CV: +S1+S2 regular rate and rhythm  Lung:  respirations nonlabored, good inspiratory effort  Abdomen: soft, nondistended, nontender to palpation. Midline incision with gauze dressing in place, c/d/i. Ostomy dark with gas and stool present in bag. Moses drain with 5cc/24hrs serosanguinous output.   : no bladder tenderness or distention  Extremities: no pedal edema or calf tenderness noted       LABS:            I&O's Detail    30 Sep 2024 07:01  -  01 Oct 2024 07:00  --------------------------------------------------------  IN:    Oral Fluid: 720 mL  Total IN: 720 mL    OUT:    Bulb (mL): 5 mL  Total OUT: 5 mL    Total NET: 715 mL      01 Oct 2024 07:01  -  01 Oct 2024 10:26  --------------------------------------------------------  IN:    Oral Fluid: 360 mL  Total IN: 360 mL    OUT:  Total OUT: 0 mL    Total NET: 360 mL      Impression: 47 yo M with PMH of anixety, right epididymoorchitis in April, current smoker, former ETOH (sober 11 years), COPD presents with lower abdominal pain for a few days, found with 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 OR Cx: Ecoli. CT 9/25: No bladder leak or extravasation identified. Agosto d/c'd 9/26, PVR: 17. Tolerating regular diet.    Plan as discussed with Dr. Dsouza   - Continue regular diet  - Continue tylenol ATC, narcotics prn   - remove drain  - local wound care   - DVT ppx   - pt reassessment today  - Patient is surgically stable for discharge  with homecare

## 2024-10-01 NOTE — DISCHARGE NOTE NURSING/CASE MANAGEMENT/SOCIAL WORK - NSDCFUADDAPPT_GEN_ALL_CORE_FT
Please call office to make an appointment with Dr. Dsouza in one week    Please follow up with your primary care physician regarding your hospitalization. Please schedule an appointment with your primary care provider within two weeks after your discharge to review your hospital course.

## 2024-10-02 RX ORDER — ALPRAZOLAM 0.5 MG/1
1 TABLET ORAL
Qty: 60 | Refills: 0
Start: 2024-10-02 | End: 2024-10-31

## 2024-10-02 RX ORDER — PANTOPRAZOLE SODIUM 40 MG/1
1 TABLET, DELAYED RELEASE ORAL
Qty: 30 | Refills: 0
Start: 2024-10-02 | End: 2024-10-31

## 2024-10-06 DIAGNOSIS — E83.42 HYPOMAGNESEMIA: ICD-10-CM

## 2024-10-06 DIAGNOSIS — F41.9 ANXIETY DISORDER, UNSPECIFIED: ICD-10-CM

## 2024-10-06 DIAGNOSIS — N32.1 VESICOINTESTINAL FISTULA: ICD-10-CM

## 2024-10-06 DIAGNOSIS — J98.11 ATELECTASIS: ICD-10-CM
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
## 2024-10-06 DIAGNOSIS — E83.39 OTHER DISORDERS OF PHOSPHORUS METABOLISM: ICD-10-CM

## 2024-10-06 DIAGNOSIS — F17.200 NICOTINE DEPENDENCE, UNSPECIFIED, UNCOMPLICATED: ICD-10-CM

## 2024-10-06 DIAGNOSIS — K65.0 GENERALIZED (ACUTE) PERITONITIS: ICD-10-CM

## 2024-10-06 DIAGNOSIS — N99.72 ACCIDENTAL PUNCTURE AND LACERATION OF A GENITOURINARY SYSTEM ORGAN OR STRUCTURE DURING OTHER PROCEDURE: ICD-10-CM

## 2024-10-06 DIAGNOSIS — B96.20 UNSPECIFIED ESCHERICHIA COLI [E. COLI] AS THE CAUSE OF DISEASES CLASSIFIED ELSEWHERE: ICD-10-CM

## 2024-10-07 ENCOUNTER — APPOINTMENT (OUTPATIENT)
Dept: BARIATRICS | Facility: CLINIC | Age: 49
End: 2024-10-07
Payer: COMMERCIAL

## 2024-10-07 VITALS
DIASTOLIC BLOOD PRESSURE: 81 MMHG | SYSTOLIC BLOOD PRESSURE: 122 MMHG | BODY MASS INDEX: 24.5 KG/M2 | TEMPERATURE: 96.9 F | HEIGHT: 71 IN | HEART RATE: 98 BPM | OXYGEN SATURATION: 99 % | WEIGHT: 175 LBS

## 2024-10-07 PROCEDURE — 99024 POSTOP FOLLOW-UP VISIT: CPT

## 2024-10-25 ENCOUNTER — APPOINTMENT (OUTPATIENT)
Dept: INTERNAL MEDICINE | Facility: CLINIC | Age: 49
End: 2024-10-25
Payer: COMMERCIAL

## 2024-10-25 ENCOUNTER — NON-APPOINTMENT (OUTPATIENT)
Age: 49
End: 2024-10-25

## 2024-10-25 VITALS
HEART RATE: 116 BPM | DIASTOLIC BLOOD PRESSURE: 81 MMHG | BODY MASS INDEX: 23.52 KG/M2 | WEIGHT: 168 LBS | OXYGEN SATURATION: 98 % | SYSTOLIC BLOOD PRESSURE: 140 MMHG | HEIGHT: 71 IN

## 2024-10-25 DIAGNOSIS — Z12.5 ENCOUNTER FOR SCREENING FOR MALIGNANT NEOPLASM OF PROSTATE: ICD-10-CM

## 2024-10-25 DIAGNOSIS — Z13.29 ENCOUNTER FOR SCREENING FOR OTHER SUSPECTED ENDOCRINE DISORDER: ICD-10-CM

## 2024-10-25 DIAGNOSIS — Z13.0 ENCOUNTER FOR SCREENING FOR DISEASES OF THE BLOOD AND BLOOD-FORMING ORGANS AND CERTAIN DISORDERS INVOLVING THE IMMUNE MECHANISM: ICD-10-CM

## 2024-10-25 DIAGNOSIS — Z13.1 ENCOUNTER FOR SCREENING FOR DIABETES MELLITUS: ICD-10-CM

## 2024-10-25 DIAGNOSIS — Z00.00 ENCOUNTER FOR GENERAL ADULT MEDICAL EXAMINATION W/OUT ABNORMAL FINDINGS: ICD-10-CM

## 2024-10-25 DIAGNOSIS — Z13.220 ENCOUNTER FOR SCREENING FOR LIPOID DISORDERS: ICD-10-CM

## 2024-10-25 PROCEDURE — 93000 ELECTROCARDIOGRAM COMPLETE: CPT

## 2024-10-25 PROCEDURE — 36415 COLL VENOUS BLD VENIPUNCTURE: CPT

## 2024-10-25 PROCEDURE — 99386 PREV VISIT NEW AGE 40-64: CPT

## 2024-10-26 LAB
ALBUMIN SERPL ELPH-MCNC: 5 G/DL
ALP BLD-CCNC: 141 U/L
ALT SERPL-CCNC: 34 U/L
ANION GAP SERPL CALC-SCNC: 16 MMOL/L
APPEARANCE: CLEAR
AST SERPL-CCNC: 20 U/L
BACTERIA: NEGATIVE /HPF
BASOPHILS # BLD AUTO: 0.08 K/UL
BASOPHILS NFR BLD AUTO: 0.7 %
BILIRUB SERPL-MCNC: 0.2 MG/DL
BILIRUBIN URINE: NEGATIVE
BLOOD URINE: NEGATIVE
BUN SERPL-MCNC: 10 MG/DL
CALCIUM SERPL-MCNC: 10.6 MG/DL
CAST: 0 /LPF
CHLORIDE SERPL-SCNC: 106 MMOL/L
CHOLEST SERPL-MCNC: 203 MG/DL
CO2 SERPL-SCNC: 24 MMOL/L
COLOR: YELLOW
CREAT SERPL-MCNC: 0.84 MG/DL
EGFR: 106 ML/MIN/1.73M2
EOSINOPHIL # BLD AUTO: 0.01 K/UL
EOSINOPHIL NFR BLD AUTO: 0.1 %
EPITHELIAL CELLS: 0 /HPF
ESTIMATED AVERAGE GLUCOSE: 100 MG/DL
GLUCOSE QUALITATIVE U: NEGATIVE MG/DL
GLUCOSE SERPL-MCNC: 105 MG/DL
HBA1C MFR BLD HPLC: 5.1 %
HCT VFR BLD CALC: 50 %
HDLC SERPL-MCNC: 63 MG/DL
HGB BLD-MCNC: 15.6 G/DL
IMM GRANULOCYTES NFR BLD AUTO: 0.4 %
KETONES URINE: NEGATIVE MG/DL
LDLC SERPL CALC-MCNC: 127 MG/DL
LEUKOCYTE ESTERASE URINE: NEGATIVE
LYMPHOCYTES # BLD AUTO: 1.97 K/UL
LYMPHOCYTES NFR BLD AUTO: 17.6 %
MAN DIFF?: NORMAL
MCHC RBC-ENTMCNC: 31.2 GM/DL
MCHC RBC-ENTMCNC: 31.2 PG
MCV RBC AUTO: 100 FL
MICROSCOPIC-UA: NORMAL
MONOCYTES # BLD AUTO: 0.73 K/UL
MONOCYTES NFR BLD AUTO: 6.5 %
NEUTROPHILS # BLD AUTO: 8.39 K/UL
NEUTROPHILS NFR BLD AUTO: 74.7 %
NITRITE URINE: NEGATIVE
NONHDLC SERPL-MCNC: 139 MG/DL
PH URINE: 6.5
PLATELET # BLD AUTO: 373 K/UL
POTASSIUM SERPL-SCNC: 5.8 MMOL/L
PROT SERPL-MCNC: 8 G/DL
PROTEIN URINE: NEGATIVE MG/DL
PSA SERPL-MCNC: 2.31 NG/ML
RBC # BLD: 5 M/UL
RBC # FLD: 14.1 %
RED BLOOD CELLS URINE: 0 /HPF
SODIUM SERPL-SCNC: 146 MMOL/L
SPECIFIC GRAVITY URINE: 1.01
TRIGL SERPL-MCNC: 70 MG/DL
TSH SERPL-ACNC: 0.43 UIU/ML
UROBILINOGEN URINE: 0.2 MG/DL
WBC # FLD AUTO: 11.22 K/UL
WHITE BLOOD CELLS URINE: 0 /HPF

## 2024-11-04 ENCOUNTER — APPOINTMENT (OUTPATIENT)
Dept: BARIATRICS | Facility: CLINIC | Age: 49
End: 2024-11-04
Payer: COMMERCIAL

## 2024-11-04 VITALS
HEIGHT: 69 IN | WEIGHT: 168 LBS | TEMPERATURE: 98.2 F | OXYGEN SATURATION: 98 % | DIASTOLIC BLOOD PRESSURE: 107 MMHG | SYSTOLIC BLOOD PRESSURE: 156 MMHG | HEART RATE: 112 BPM | BODY MASS INDEX: 24.88 KG/M2

## 2024-11-04 PROCEDURE — 99024 POSTOP FOLLOW-UP VISIT: CPT

## 2024-11-07 ENCOUNTER — NON-APPOINTMENT (OUTPATIENT)
Age: 49
End: 2024-11-07

## 2024-11-07 ENCOUNTER — APPOINTMENT (OUTPATIENT)
Dept: GASTROENTEROLOGY | Facility: CLINIC | Age: 49
End: 2024-11-07
Payer: COMMERCIAL

## 2024-11-07 VITALS
SYSTOLIC BLOOD PRESSURE: 136 MMHG | TEMPERATURE: 98.4 F | BODY MASS INDEX: 24.88 KG/M2 | HEART RATE: 111 BPM | OXYGEN SATURATION: 99 % | DIASTOLIC BLOOD PRESSURE: 98 MMHG | RESPIRATION RATE: 16 BRPM | HEIGHT: 69 IN | WEIGHT: 168 LBS

## 2024-11-07 DIAGNOSIS — Z93.3 COLOSTOMY STATUS: ICD-10-CM

## 2024-11-07 DIAGNOSIS — N32.1 VESICOINTESTINAL FISTULA: ICD-10-CM

## 2024-11-07 DIAGNOSIS — Z87.19 PERSONAL HISTORY OF OTHER DISEASES OF THE DIGESTIVE SYSTEM: ICD-10-CM

## 2024-11-07 DIAGNOSIS — R63.4 ABNORMAL WEIGHT LOSS: ICD-10-CM

## 2024-11-07 DIAGNOSIS — R63.0 ANOREXIA: ICD-10-CM

## 2024-11-07 DIAGNOSIS — K56.609 UNSPECIFIED INTESTINAL OBSTRUCTION, UNSPECIFIED AS TO PARTIAL VERSUS COMPLETE OBSTRUCTION: ICD-10-CM

## 2024-11-07 DIAGNOSIS — Z12.11 ENCOUNTER FOR SCREENING FOR MALIGNANT NEOPLASM OF COLON: ICD-10-CM

## 2024-11-07 PROCEDURE — 99244 OFF/OP CNSLTJ NEW/EST MOD 40: CPT

## 2024-11-07 RX ORDER — SODIUM SULFATE, POTASSIUM SULFATE AND MAGNESIUM SULFATE 1.6; 3.13; 17.5 G/177ML; G/177ML; G/177ML
17.5-3.13-1.6 SOLUTION ORAL
Qty: 2 | Refills: 0 | Status: ACTIVE | COMMUNITY
Start: 2024-11-07 | End: 1900-01-01

## 2024-11-12 ENCOUNTER — APPOINTMENT (OUTPATIENT)
Dept: GASTROENTEROLOGY | Facility: AMBULATORY MEDICAL SERVICES | Age: 49
End: 2024-11-12
Payer: COMMERCIAL

## 2024-11-12 PROCEDURE — 44388 COLONOSCOPY THRU STOMA SPX: CPT

## 2024-11-14 ENCOUNTER — TRANSCRIPTION ENCOUNTER (OUTPATIENT)
Age: 49
End: 2024-11-14

## 2024-12-03 ENCOUNTER — APPOINTMENT (OUTPATIENT)
Dept: INTERNAL MEDICINE | Facility: CLINIC | Age: 49
End: 2024-12-03
Payer: COMMERCIAL

## 2024-12-03 VITALS
DIASTOLIC BLOOD PRESSURE: 91 MMHG | WEIGHT: 180 LBS | SYSTOLIC BLOOD PRESSURE: 152 MMHG | HEART RATE: 102 BPM | HEIGHT: 69 IN | OXYGEN SATURATION: 99 % | BODY MASS INDEX: 26.66 KG/M2

## 2024-12-03 DIAGNOSIS — D72.829 ELEVATED WHITE BLOOD CELL COUNT, UNSPECIFIED: ICD-10-CM

## 2024-12-03 DIAGNOSIS — F32.A ANXIETY DISORDER, UNSPECIFIED: ICD-10-CM

## 2024-12-03 DIAGNOSIS — F41.9 ANXIETY DISORDER, UNSPECIFIED: ICD-10-CM

## 2024-12-03 DIAGNOSIS — E83.52 HYPERCALCEMIA: ICD-10-CM

## 2024-12-03 PROCEDURE — 36415 COLL VENOUS BLD VENIPUNCTURE: CPT

## 2024-12-03 PROCEDURE — 99214 OFFICE O/P EST MOD 30 MIN: CPT

## 2024-12-03 RX ORDER — ESCITALOPRAM OXALATE 10 MG/1
10 TABLET ORAL DAILY
Qty: 30 | Refills: 1 | Status: ACTIVE | COMMUNITY
Start: 2024-12-03 | End: 1900-01-01

## 2024-12-09 ENCOUNTER — NON-APPOINTMENT (OUTPATIENT)
Age: 49
End: 2024-12-09

## 2024-12-09 LAB
ALBUMIN SERPL ELPH-MCNC: 4.7 G/DL
ALP BLD-CCNC: 134 U/L
ALT SERPL-CCNC: 21 U/L
ANION GAP SERPL CALC-SCNC: 12 MMOL/L
AST SERPL-CCNC: 19 U/L
BASOPHILS # BLD AUTO: 0.11 K/UL
BASOPHILS NFR BLD AUTO: 1 %
BILIRUB SERPL-MCNC: 0.2 MG/DL
BUN SERPL-MCNC: 14 MG/DL
CALCIUM SERPL-MCNC: 10.2 MG/DL
CHLORIDE SERPL-SCNC: 104 MMOL/L
CO2 SERPL-SCNC: 26 MMOL/L
CREAT SERPL-MCNC: 0.96 MG/DL
EGFR: 96 ML/MIN/1.73M2
EOSINOPHIL # BLD AUTO: 0.2 K/UL
EOSINOPHIL NFR BLD AUTO: 1.8 %
GLUCOSE SERPL-MCNC: 90 MG/DL
HCT VFR BLD CALC: 49.6 %
HGB BLD-MCNC: 15.3 G/DL
IMM GRANULOCYTES NFR BLD AUTO: 0.3 %
LYMPHOCYTES # BLD AUTO: 3.39 K/UL
LYMPHOCYTES NFR BLD AUTO: 30.4 %
MAN DIFF?: NORMAL
MCHC RBC-ENTMCNC: 30.7 PG
MCHC RBC-ENTMCNC: 30.8 G/DL
MCV RBC AUTO: 99.4 FL
MONOCYTES # BLD AUTO: 0.74 K/UL
MONOCYTES NFR BLD AUTO: 6.6 %
NEUTROPHILS # BLD AUTO: 6.67 K/UL
NEUTROPHILS NFR BLD AUTO: 59.9 %
PLATELET # BLD AUTO: 362 K/UL
POTASSIUM SERPL-SCNC: 4.3 MMOL/L
PROT SERPL-MCNC: 7.5 G/DL
RBC # BLD: 4.99 M/UL
RBC # FLD: 13.8 %
SODIUM SERPL-SCNC: 143 MMOL/L
WBC # FLD AUTO: 11.14 K/UL

## (undated) DEVICE — STAPLER COVIDIEN ENDO GIA STANDARD HANDLE

## (undated) DEVICE — SUT VICRYL 0 18" TIES UNDYED

## (undated) DEVICE — SUT VICRYL 2-0 27" SH UNDYED

## (undated) DEVICE — POSITIONER FOAM EGG CRATE ULNAR 2PCS (PINK)

## (undated) DEVICE — DRAPE SOL WARMING 44X44IN

## (undated) DEVICE — SUT VICRYL 0 36" CT-1 UNDYED

## (undated) DEVICE — VENODYNE/SCD SLEEVE CALF MEDIUM

## (undated) DEVICE — LUBRICATING JELLY ONESHOT 1.25OZ

## (undated) DEVICE — LIGASURE IMPACT

## (undated) DEVICE — POSITIONER STRAP ARMBOARD VELCRO TS-30

## (undated) DEVICE — LAP PAD 18 X 18"

## (undated) DEVICE — PROTECTOR HEEL / ELBOW FLUFFY

## (undated) DEVICE — POSITIONER PINK PAD PIGAZZI SYSTEM

## (undated) DEVICE — SOL IRR POUR H2O 1500ML

## (undated) DEVICE — FRA-ESU BOVIE FORCE TRIAD T6D04777DX: Type: DURABLE MEDICAL EQUIPMENT

## (undated) DEVICE — DRSG TEGADERM 2.5X3"

## (undated) DEVICE — ELCTR GROUNDING PAD ADULT COVIDIEN

## (undated) DEVICE — POOLE SUCTION TIP

## (undated) DEVICE — WARMING BLANKET FULL ADULT

## (undated) DEVICE — SUT PROLENE 2 60" TP-1

## (undated) DEVICE — DRAPE IOBAN 23" X 23"

## (undated) DEVICE — ELCTR BOVIE TIP BLADE INSULATED 6.5" EDGE

## (undated) DEVICE — STAPLER SKIN MULTI DIRECTION W35

## (undated) DEVICE — GLV 7.5 PROTEXIS (WHITE)

## (undated) DEVICE — FOLEY TRAY 16FR 5CC LF UMETER CLOSED

## (undated) DEVICE — SOL IRR POUR NS 0.9% 1500ML

## (undated) DEVICE — SUT VICRYL PLUS 2-0 18" TIES UNDYED

## (undated) DEVICE — PACK MAJOR ABDOMINAL WITH LAP

## (undated) DEVICE — SUT SILK 3-0 18" SH (POP-OFF)

## (undated) DEVICE — GLV 8 PROTEXIS (WHITE)